# Patient Record
Sex: FEMALE | Race: WHITE | Employment: UNEMPLOYED | URBAN - METROPOLITAN AREA
[De-identification: names, ages, dates, MRNs, and addresses within clinical notes are randomized per-mention and may not be internally consistent; named-entity substitution may affect disease eponyms.]

---

## 2017-01-03 ENCOUNTER — HOSPITAL ENCOUNTER (OUTPATIENT)
Dept: PHYSICAL THERAPY | Age: 57
Discharge: HOME | End: 2017-01-03
Admitting: INTERNAL MEDICINE

## 2017-01-05 ENCOUNTER — HOSPITAL ENCOUNTER (OUTPATIENT)
Dept: PHYSICAL THERAPY | Age: 57
Discharge: HOME | End: 2017-01-05
Admitting: INTERNAL MEDICINE

## 2017-01-09 ENCOUNTER — HOSPITAL ENCOUNTER (OUTPATIENT)
Dept: PHYSICAL THERAPY | Age: 57
Discharge: HOME | End: 2017-01-09
Admitting: INTERNAL MEDICINE

## 2017-01-11 ENCOUNTER — HOSPITAL ENCOUNTER (OUTPATIENT)
Dept: PHYSICAL THERAPY | Age: 57
Discharge: HOME | End: 2017-01-11
Admitting: INTERNAL MEDICINE

## 2017-01-17 ENCOUNTER — HOSPITAL ENCOUNTER (OUTPATIENT)
Dept: PHYSICAL THERAPY | Age: 57
Discharge: HOME | End: 2017-01-17
Admitting: INTERNAL MEDICINE

## 2017-01-19 ENCOUNTER — HOSPITAL ENCOUNTER (OUTPATIENT)
Dept: PHYSICAL THERAPY | Age: 57
Discharge: HOME | End: 2017-01-19
Admitting: INTERNAL MEDICINE

## 2017-01-24 ENCOUNTER — HOSPITAL ENCOUNTER (OUTPATIENT)
Dept: PHYSICAL THERAPY | Age: 57
Discharge: HOME | End: 2017-01-24
Admitting: INTERNAL MEDICINE

## 2017-01-26 ENCOUNTER — HOSPITAL ENCOUNTER (OUTPATIENT)
Dept: PHYSICAL THERAPY | Age: 57
Discharge: HOME | End: 2017-01-26
Admitting: INTERNAL MEDICINE

## 2017-01-31 ENCOUNTER — HOSPITAL ENCOUNTER (OUTPATIENT)
Dept: PHYSICAL THERAPY | Age: 57
Discharge: HOME | End: 2017-01-31
Admitting: INTERNAL MEDICINE

## 2017-02-02 ENCOUNTER — HOSPITAL ENCOUNTER (OUTPATIENT)
Dept: PHYSICAL THERAPY | Age: 57
Discharge: HOME | End: 2017-02-02
Admitting: INTERNAL MEDICINE

## 2017-02-06 ENCOUNTER — HOSPITAL ENCOUNTER (OUTPATIENT)
Dept: PHYSICAL THERAPY | Age: 57
Discharge: HOME | End: 2017-02-06
Admitting: INTERNAL MEDICINE

## 2017-02-09 ENCOUNTER — HOSPITAL ENCOUNTER (OUTPATIENT)
Dept: PHYSICAL THERAPY | Age: 57
Discharge: HOME | End: 2017-02-09
Admitting: INTERNAL MEDICINE

## 2017-05-12 ENCOUNTER — OFFICE VISIT (OUTPATIENT)
Dept: INTERNAL MEDICINE CLINIC | Age: 57
End: 2017-05-12

## 2017-05-12 VITALS
DIASTOLIC BLOOD PRESSURE: 84 MMHG | BODY MASS INDEX: 27.64 KG/M2 | WEIGHT: 156 LBS | RESPIRATION RATE: 16 BRPM | SYSTOLIC BLOOD PRESSURE: 126 MMHG | HEIGHT: 63 IN

## 2017-05-12 DIAGNOSIS — K21.00 GASTROESOPHAGEAL REFLUX DISEASE WITH ESOPHAGITIS: Primary | ICD-10-CM

## 2017-05-12 PROCEDURE — 99213 OFFICE O/P EST LOW 20 MIN: CPT | Performed by: INTERNAL MEDICINE

## 2017-05-12 RX ORDER — OMEPRAZOLE 40 MG/1
40 CAPSULE, DELAYED RELEASE ORAL DAILY
Qty: 30 CAPSULE | Refills: 3 | Status: SHIPPED | OUTPATIENT
Start: 2017-05-12 | End: 2017-11-03 | Stop reason: SDUPTHER

## 2017-06-14 ENCOUNTER — OFFICE VISIT (OUTPATIENT)
Dept: INTERNAL MEDICINE CLINIC | Age: 57
End: 2017-06-14

## 2017-06-14 VITALS
HEIGHT: 63 IN | RESPIRATION RATE: 16 BRPM | BODY MASS INDEX: 26.93 KG/M2 | WEIGHT: 152 LBS | DIASTOLIC BLOOD PRESSURE: 78 MMHG | SYSTOLIC BLOOD PRESSURE: 108 MMHG

## 2017-06-14 DIAGNOSIS — E78.2 MIXED HYPERLIPIDEMIA: Chronic | ICD-10-CM

## 2017-06-14 DIAGNOSIS — M15.9 PRIMARY OSTEOARTHRITIS INVOLVING MULTIPLE JOINTS: Chronic | ICD-10-CM

## 2017-06-14 DIAGNOSIS — Z00.00 PHYSICAL EXAM, ANNUAL: Primary | ICD-10-CM

## 2017-06-14 DIAGNOSIS — K21.00 GASTROESOPHAGEAL REFLUX DISEASE WITH ESOPHAGITIS: Chronic | ICD-10-CM

## 2017-06-14 DIAGNOSIS — Z12.11 SCREENING FOR COLON CANCER: ICD-10-CM

## 2017-06-14 PROBLEM — M15.0 PRIMARY OSTEOARTHRITIS INVOLVING MULTIPLE JOINTS: Chronic | Status: ACTIVE | Noted: 2017-06-14

## 2017-06-14 LAB
A/G RATIO: 1.5 (ref 1.1–2.2)
ALBUMIN SERPL-MCNC: 4.5 G/DL (ref 3.4–5)
ALP BLD-CCNC: 99 U/L (ref 40–129)
ALT SERPL-CCNC: 64 U/L (ref 10–40)
ANION GAP SERPL CALCULATED.3IONS-SCNC: 16 MMOL/L (ref 3–16)
AST SERPL-CCNC: 45 U/L (ref 15–37)
BASOPHILS ABSOLUTE: 0.1 K/UL (ref 0–0.2)
BASOPHILS RELATIVE PERCENT: 0.8 %
BILIRUB SERPL-MCNC: 0.5 MG/DL (ref 0–1)
BUN BLDV-MCNC: 15 MG/DL (ref 7–20)
CALCIUM SERPL-MCNC: 9.5 MG/DL (ref 8.3–10.6)
CHLORIDE BLD-SCNC: 100 MMOL/L (ref 99–110)
CHOLESTEROL, TOTAL: 240 MG/DL (ref 0–199)
CO2: 26 MMOL/L (ref 21–32)
CREAT SERPL-MCNC: 0.7 MG/DL (ref 0.6–1.1)
EOSINOPHILS ABSOLUTE: 0.1 K/UL (ref 0–0.6)
EOSINOPHILS RELATIVE PERCENT: 2.3 %
GFR AFRICAN AMERICAN: >60
GFR NON-AFRICAN AMERICAN: >60
GLOBULIN: 3.1 G/DL
GLUCOSE BLD-MCNC: 103 MG/DL (ref 70–99)
HCT VFR BLD CALC: 44.7 % (ref 36–48)
HDLC SERPL-MCNC: 51 MG/DL (ref 40–60)
HEMOGLOBIN: 14.5 G/DL (ref 12–16)
LDL CHOLESTEROL CALCULATED: 145 MG/DL
LYMPHOCYTES ABSOLUTE: 1.9 K/UL (ref 1–5.1)
LYMPHOCYTES RELATIVE PERCENT: 28.7 %
MCH RBC QN AUTO: 32.2 PG (ref 26–34)
MCHC RBC AUTO-ENTMCNC: 32.3 G/DL (ref 31–36)
MCV RBC AUTO: 99.5 FL (ref 80–100)
MONOCYTES ABSOLUTE: 0.5 K/UL (ref 0–1.3)
MONOCYTES RELATIVE PERCENT: 7 %
NEUTROPHILS ABSOLUTE: 4 K/UL (ref 1.7–7.7)
NEUTROPHILS RELATIVE PERCENT: 61.2 %
PDW BLD-RTO: 12.8 % (ref 12.4–15.4)
PLATELET # BLD: 227 K/UL (ref 135–450)
PMV BLD AUTO: 10.2 FL (ref 5–10.5)
POTASSIUM SERPL-SCNC: 4.5 MMOL/L (ref 3.5–5.1)
RBC # BLD: 4.5 M/UL (ref 4–5.2)
SODIUM BLD-SCNC: 142 MMOL/L (ref 136–145)
TOTAL PROTEIN: 7.6 G/DL (ref 6.4–8.2)
TRIGL SERPL-MCNC: 221 MG/DL (ref 0–150)
TSH SERPL DL<=0.05 MIU/L-ACNC: 1.48 UIU/ML (ref 0.27–4.2)
VLDLC SERPL CALC-MCNC: 44 MG/DL
WBC # BLD: 6.5 K/UL (ref 4–11)

## 2017-06-14 PROCEDURE — 99396 PREV VISIT EST AGE 40-64: CPT | Performed by: INTERNAL MEDICINE

## 2017-06-14 PROCEDURE — 36415 COLL VENOUS BLD VENIPUNCTURE: CPT | Performed by: INTERNAL MEDICINE

## 2017-06-14 ASSESSMENT — PATIENT HEALTH QUESTIONNAIRE - PHQ9
2. FEELING DOWN, DEPRESSED OR HOPELESS: 0
1. LITTLE INTEREST OR PLEASURE IN DOING THINGS: 0
SUM OF ALL RESPONSES TO PHQ9 QUESTIONS 1 & 2: 0
SUM OF ALL RESPONSES TO PHQ QUESTIONS 1-9: 0

## 2017-06-17 ENCOUNTER — TELEPHONE (OUTPATIENT)
Dept: INTERNAL MEDICINE CLINIC | Age: 57
End: 2017-06-17

## 2017-06-17 RX ORDER — METHYLPREDNISOLONE 4 MG/1
TABLET ORAL
Qty: 1 KIT | Refills: 0 | Status: SHIPPED | OUTPATIENT
Start: 2017-06-17 | End: 2017-06-23

## 2017-10-03 RX ORDER — MELOXICAM 15 MG/1
TABLET ORAL
Qty: 30 TABLET | Refills: 3 | Status: SHIPPED | OUTPATIENT
Start: 2017-10-03 | End: 2019-06-25

## 2017-11-06 RX ORDER — OMEPRAZOLE 40 MG/1
40 CAPSULE, DELAYED RELEASE ORAL DAILY
Qty: 30 CAPSULE | Refills: 5 | Status: SHIPPED | OUTPATIENT
Start: 2017-11-06 | End: 2019-01-03 | Stop reason: SDUPTHER

## 2019-01-03 ENCOUNTER — OFFICE VISIT (OUTPATIENT)
Dept: INTERNAL MEDICINE CLINIC | Age: 59
End: 2019-01-03

## 2019-01-03 VITALS
HEART RATE: 112 BPM | DIASTOLIC BLOOD PRESSURE: 60 MMHG | BODY MASS INDEX: 27.1 KG/M2 | OXYGEN SATURATION: 98 % | WEIGHT: 153 LBS | SYSTOLIC BLOOD PRESSURE: 114 MMHG

## 2019-01-03 DIAGNOSIS — H60.331 ACUTE SWIMMER'S EAR OF RIGHT SIDE: Primary | ICD-10-CM

## 2019-01-03 DIAGNOSIS — K21.00 GASTROESOPHAGEAL REFLUX DISEASE WITH ESOPHAGITIS: Chronic | ICD-10-CM

## 2019-01-03 PROCEDURE — 99214 OFFICE O/P EST MOD 30 MIN: CPT | Performed by: NURSE PRACTITIONER

## 2019-01-03 RX ORDER — NEOMYCIN SULFATE, POLYMYXIN B SULFATE AND HYDROCORTISONE 10; 3.5; 1 MG/ML; MG/ML; [USP'U]/ML
3 SUSPENSION/ DROPS AURICULAR (OTIC) 4 TIMES DAILY
Qty: 1 BOTTLE | Refills: 0 | Status: SHIPPED | OUTPATIENT
Start: 2019-01-03 | End: 2019-01-10

## 2019-01-03 RX ORDER — OMEPRAZOLE 40 MG/1
40 CAPSULE, DELAYED RELEASE ORAL DAILY
Qty: 30 CAPSULE | Refills: 5 | Status: SHIPPED | OUTPATIENT
Start: 2019-01-03 | End: 2019-11-14 | Stop reason: SDUPTHER

## 2019-01-03 ASSESSMENT — ENCOUNTER SYMPTOMS
RHINORRHEA: 0
ABDOMINAL PAIN: 0
SHORTNESS OF BREATH: 0
CHEST TIGHTNESS: 0
DIARRHEA: 0
APNEA: 0
CHOKING: 0
WHEEZING: 0
ABDOMINAL DISTENTION: 0
COUGH: 0
VOMITING: 0
BACK PAIN: 0
STRIDOR: 0
SORE THROAT: 0

## 2019-06-25 ENCOUNTER — OFFICE VISIT (OUTPATIENT)
Dept: INTERNAL MEDICINE CLINIC | Age: 59
End: 2019-06-25
Payer: MEDICAID

## 2019-06-25 VITALS
HEIGHT: 63 IN | RESPIRATION RATE: 12 BRPM | SYSTOLIC BLOOD PRESSURE: 124 MMHG | TEMPERATURE: 97.8 F | HEART RATE: 88 BPM | DIASTOLIC BLOOD PRESSURE: 86 MMHG | BODY MASS INDEX: 26.05 KG/M2 | WEIGHT: 147 LBS | OXYGEN SATURATION: 97 %

## 2019-06-25 DIAGNOSIS — R19.7 DIARRHEA, UNSPECIFIED TYPE: ICD-10-CM

## 2019-06-25 DIAGNOSIS — R11.0 NAUSEA: Primary | ICD-10-CM

## 2019-06-25 LAB
ANION GAP SERPL CALCULATED.3IONS-SCNC: 12 MMOL/L (ref 3–16)
BUN BLDV-MCNC: 14 MG/DL (ref 7–20)
CALCIUM SERPL-MCNC: 9.6 MG/DL (ref 8.3–10.6)
CHLORIDE BLD-SCNC: 102 MMOL/L (ref 99–110)
CO2: 22 MMOL/L (ref 21–32)
CREAT SERPL-MCNC: 0.8 MG/DL (ref 0.6–1.1)
GFR AFRICAN AMERICAN: >60
GFR NON-AFRICAN AMERICAN: >60
GLUCOSE BLD-MCNC: 96 MG/DL (ref 70–99)
HCT VFR BLD CALC: 44 % (ref 36–48)
HEMOGLOBIN: 14.9 G/DL (ref 12–16)
MCH RBC QN AUTO: 33.4 PG (ref 26–34)
MCHC RBC AUTO-ENTMCNC: 33.8 G/DL (ref 31–36)
MCV RBC AUTO: 98.9 FL (ref 80–100)
PDW BLD-RTO: 13.2 % (ref 12.4–15.4)
PLATELET # BLD: 227 K/UL (ref 135–450)
PMV BLD AUTO: 9.9 FL (ref 5–10.5)
POTASSIUM SERPL-SCNC: 4.1 MMOL/L (ref 3.5–5.1)
RBC # BLD: 4.45 M/UL (ref 4–5.2)
SODIUM BLD-SCNC: 136 MMOL/L (ref 136–145)
WBC # BLD: 5.8 K/UL (ref 4–11)

## 2019-06-25 PROCEDURE — 1036F TOBACCO NON-USER: CPT | Performed by: NURSE PRACTITIONER

## 2019-06-25 PROCEDURE — 3017F COLORECTAL CA SCREEN DOC REV: CPT | Performed by: NURSE PRACTITIONER

## 2019-06-25 PROCEDURE — 99213 OFFICE O/P EST LOW 20 MIN: CPT | Performed by: NURSE PRACTITIONER

## 2019-06-25 PROCEDURE — G8427 DOCREV CUR MEDS BY ELIG CLIN: HCPCS | Performed by: NURSE PRACTITIONER

## 2019-06-25 PROCEDURE — 36415 COLL VENOUS BLD VENIPUNCTURE: CPT | Performed by: NURSE PRACTITIONER

## 2019-06-25 PROCEDURE — G8419 CALC BMI OUT NRM PARAM NOF/U: HCPCS | Performed by: NURSE PRACTITIONER

## 2019-06-25 PROCEDURE — 3014F SCREEN MAMMO DOC REV: CPT | Performed by: NURSE PRACTITIONER

## 2019-06-25 RX ORDER — ONDANSETRON 4 MG/1
4 TABLET, ORALLY DISINTEGRATING ORAL EVERY 8 HOURS PRN
Qty: 20 TABLET | Refills: 0 | Status: SHIPPED | OUTPATIENT
Start: 2019-06-25 | End: 2019-11-14

## 2019-06-25 RX ORDER — GREEN TEA/HOODIA GORDONII 315-12.5MG
1 CAPSULE ORAL DAILY
Qty: 14 TABLET | Refills: 0 | Status: SHIPPED | OUTPATIENT
Start: 2019-06-25 | End: 2019-07-09

## 2019-06-25 ASSESSMENT — ENCOUNTER SYMPTOMS
DIARRHEA: 1
BLOOD IN STOOL: 0
RHINORRHEA: 0
SHORTNESS OF BREATH: 0
CHANGE IN BOWEL HABIT: 1
VOMITING: 1
CHEST TIGHTNESS: 0
SORE THROAT: 0
VISUAL CHANGE: 0
CONSTIPATION: 0
FLATUS: 0
STRIDOR: 0
ABDOMINAL PAIN: 0
COLOR CHANGE: 0
BACK PAIN: 0
ABDOMINAL DISTENTION: 0
SINUS PAIN: 0
ANAL BLEEDING: 0
SINUS PRESSURE: 0
COUGH: 0
CHOKING: 0
SWOLLEN GLANDS: 0
NAUSEA: 1
BLOATING: 0

## 2019-06-25 ASSESSMENT — PATIENT HEALTH QUESTIONNAIRE - PHQ9: DEPRESSION UNABLE TO ASSESS: URGENT/EMERGENT SITUATION

## 2019-06-25 NOTE — PROGRESS NOTES
Pt is here for: nausea, and diarrhea     Chief Complaint   Patient presents with    Nausea     since sat @ 3 ,    Diarrhea     lost about 4 pounds since sat,    Other     was recently in [place] returned on the 7th started gettuing sick on the 6th       Diarrhea    This is a new problem. The current episode started yesterday. The problem occurs 5 to 10 times per day. The problem has been waxing and waning. The stool consistency is described as watery. The patient states that diarrhea does not awaken her from sleep. Associated symptoms include vomiting. Pertinent negatives include no abdominal pain, arthralgias, bloating, chills, coughing, fever, headaches, increased  flatus, myalgias, sweats, URI or weight loss. Exacerbated by: eating  There are no known risk factors. She has tried anti-motility drug for the symptoms. The treatment provided mild relief. There is no history of bowel resection, inflammatory bowel disease, irritable bowel syndrome, malabsorption, a recent abdominal surgery or short gut syndrome. Nausea & Vomiting   This is a new problem. The current episode started in the past 7 days. The problem occurs intermittently. The problem has been gradually improving. Associated symptoms include a change in bowel habit, fatigue, nausea, vomiting and weakness. Pertinent negatives include no abdominal pain, anorexia, arthralgias, chest pain, chills, congestion, coughing, fever, headaches, joint swelling, myalgias, neck pain, numbness, rash, sore throat, swollen glands, urinary symptoms, vertigo or visual change. The symptoms are aggravated by eating and drinking. She has tried rest, sleep and drinking for the symptoms. The treatment provided mild relief. /86 (Site: Left Upper Arm, Position: Sitting, Cuff Size: Medium Adult)   Pulse 88   Temp 97.8 °F (36.6 °C)   Resp 12   Ht 5' 3\" (1.6 m)   Wt 147 lb (66.7 kg)   LMP 10/03/2011   SpO2 97%   Breastfeeding?  No   BMI 26.04 kg/m²       Past Medical History:   Diagnosis Date    Fibromyalgia     Gastroesophageal reflux disease with esophagitis 6/14/2017    Lateral epicondylitis of left elbow 12/5/2016    Mixed hyperlipidemia 6/14/2017    Primary osteoarthritis involving multiple joints 6/14/2017       Past Surgical History:   Procedure Laterality Date    BLADDER SURGERY      CHOLECYSTECTOMY      SINUS SURGERY         Allergies   Allergen Reactions    Codeine Nausea And Vomiting       Outpatient Medications Marked as Taking for the 6/25/19 encounter (Office Visit) with 26 MINESH Jesus CNP   Medication Sig Dispense Refill    omeprazole (PRILOSEC) 40 MG delayed release capsule Take 1 capsule by mouth daily 30 capsule 5       Family History   Problem Relation Age of Onset    Breast Cancer Mother     Stroke Father     Diabetes Maternal Aunt     High Blood Pressure Maternal Aunt     Diabetes Maternal Uncle     High Blood Pressure Maternal Uncle        Social History     Socioeconomic History    Marital status:      Spouse name: Not on file    Number of children: Not on file    Years of education: Not on file    Highest education level: Not on file   Occupational History    Not on file   Social Needs    Financial resource strain: Not on file    Food insecurity:     Worry: Not on file     Inability: Not on file    Transportation needs:     Medical: Not on file     Non-medical: Not on file   Tobacco Use    Smoking status: Former Smoker     Packs/day: 0.50     Types: Cigarettes    Smokeless tobacco: Never Used   Substance and Sexual Activity    Alcohol use: No    Drug use: No    Sexual activity: Yes   Lifestyle    Physical activity:     Days per week: Not on file     Minutes per session: Not on file    Stress: Not on file   Relationships    Social connections:     Talks on phone: Not on file     Gets together: Not on file     Attends Nondenominational service: Not on file     Active member of club or organization: Not on file     Attends meetings of clubs or organizations: Not on file     Relationship status: Not on file    Intimate partner violence:     Fear of current or ex partner: Not on file     Emotionally abused: Not on file     Physically abused: Not on file     Forced sexual activity: Not on file   Other Topics Concern    Not on file   Social History Narrative    Not on file       Review of Systems   Constitutional: Positive for activity change, appetite change, fatigue and unexpected weight change (4 lb weight loss over the past 5 days ). Negative for chills, fever and weight loss. HENT: Negative for congestion, rhinorrhea, sinus pressure, sinus pain, sneezing and sore throat. Respiratory: Negative for cough, choking, chest tightness, shortness of breath and stridor. Cardiovascular: Negative for chest pain. Gastrointestinal: Positive for change in bowel habit, diarrhea, nausea and vomiting. Negative for abdominal distention, abdominal pain, anal bleeding, anorexia, bloating, blood in stool, constipation and flatus. Genitourinary: Negative for dysuria. Musculoskeletal: Negative for arthralgias, back pain, joint swelling, myalgias and neck pain. Skin: Negative for color change, pallor and rash. Neurological: Positive for weakness. Negative for vertigo, numbness and headaches. Physical Exam   Nursing note reviewed  /86 (Site: Left Upper Arm, Position: Sitting, Cuff Size: Medium Adult)   Pulse 88   Temp 97.8 °F (36.6 °C)   Resp 12   Ht 5' 3\" (1.6 m)   Wt 147 lb (66.7 kg)   LMP 10/03/2011   SpO2 97%   Breastfeeding? No   BMI 26.04 kg/m²   BP Readings from Last 3 Encounters:   06/25/19 124/86   01/03/19 114/60   06/14/17 108/78     Wt Readings from Last 3 Encounters:   06/25/19 147 lb (66.7 kg)   01/03/19 153 lb (69.4 kg)   06/14/17 152 lb (68.9 kg)     Body mass index is 26.04 kg/m². No results found for this visit on 06/25/19.        Physical Exam   Constitutional: She is oriented to person, place, and time. She appears well-developed and well-nourished. Cardiovascular: Normal rate and normal heart sounds. Pulmonary/Chest: Effort normal and breath sounds normal. No stridor. No respiratory distress. She has no wheezes. She has no rales. She exhibits no tenderness. Abdominal: Soft. Bowel sounds are normal. She exhibits no distension and no mass. There is no tenderness. There is no rebound and no guarding. No hernia. Abdomen soft, non-guarded, non-rigid, no peritoneal s/s appreciated upon exam    Neurological: She is alert and oriented to person, place, and time. No cranial nerve deficit. Coordination normal.   Psychiatric: She has a normal mood and affect. Her behavior is normal.        Assessment/Plan   Rafael Silvestre was seen today for nausea, diarrhea and other. Diagnoses and all orders for this visit:    Nausea, suspect 2/2 to viral gastroenteritis   -     CBC; Future  -     BASIC METABOLIC PANEL; Future  -     ondansetron (ZOFRAN ODT) 4 MG disintegrating tablet; Take 1 tablet by mouth every 8 hours as needed for Nausea  -     Lactobacillus (PROBIOTIC ACIDOPHILUS) TABS; Take 1 tablet by mouth daily for 14 days    Diarrhea, unspecified type, suspect 2/2 to viral gastroenteritis   -     CBC; Future  -     BASIC METABOLIC PANEL; Future  -     ondansetron (ZOFRAN ODT) 4 MG disintegrating tablet; Take 1 tablet by mouth every 8 hours as needed for Nausea  -     Lactobacillus (PROBIOTIC ACIDOPHILUS) TABS; Take 1 tablet by mouth daily for 14 days    Patient instructed should SOB/CP or any other concerning s/s occur, to go to ER ASAP  All questions addressed and answered, patient agrees with plan of care. No follow-ups on file.

## 2019-10-29 ENCOUNTER — OFFICE VISIT (OUTPATIENT)
Dept: INTERNAL MEDICINE CLINIC | Age: 59
End: 2019-10-29
Payer: MEDICAID

## 2019-10-29 VITALS
HEART RATE: 88 BPM | BODY MASS INDEX: 26.97 KG/M2 | HEIGHT: 63 IN | DIASTOLIC BLOOD PRESSURE: 81 MMHG | SYSTOLIC BLOOD PRESSURE: 115 MMHG | RESPIRATION RATE: 16 BRPM | WEIGHT: 152.2 LBS | OXYGEN SATURATION: 97 % | TEMPERATURE: 97.9 F

## 2019-10-29 DIAGNOSIS — J01.10 ACUTE NON-RECURRENT FRONTAL SINUSITIS: Primary | ICD-10-CM

## 2019-10-29 PROCEDURE — G9899 SCRN MAM PERF RSLTS DOC: HCPCS | Performed by: NURSE PRACTITIONER

## 2019-10-29 PROCEDURE — 3017F COLORECTAL CA SCREEN DOC REV: CPT | Performed by: NURSE PRACTITIONER

## 2019-10-29 PROCEDURE — 1036F TOBACCO NON-USER: CPT | Performed by: NURSE PRACTITIONER

## 2019-10-29 PROCEDURE — G8484 FLU IMMUNIZE NO ADMIN: HCPCS | Performed by: NURSE PRACTITIONER

## 2019-10-29 PROCEDURE — G8427 DOCREV CUR MEDS BY ELIG CLIN: HCPCS | Performed by: NURSE PRACTITIONER

## 2019-10-29 PROCEDURE — G8419 CALC BMI OUT NRM PARAM NOF/U: HCPCS | Performed by: NURSE PRACTITIONER

## 2019-10-29 PROCEDURE — 99213 OFFICE O/P EST LOW 20 MIN: CPT | Performed by: NURSE PRACTITIONER

## 2019-10-29 RX ORDER — VALACYCLOVIR HYDROCHLORIDE 500 MG/1
TABLET, FILM COATED ORAL
Refills: 12 | COMMUNITY
Start: 2019-10-09

## 2019-10-29 RX ORDER — AMOXICILLIN AND CLAVULANATE POTASSIUM 875; 125 MG/1; MG/1
1 TABLET, FILM COATED ORAL 2 TIMES DAILY
Qty: 20 TABLET | Refills: 0 | Status: SHIPPED | OUTPATIENT
Start: 2019-10-29 | End: 2019-11-08

## 2019-10-29 ASSESSMENT — PATIENT HEALTH QUESTIONNAIRE - PHQ9
1. LITTLE INTEREST OR PLEASURE IN DOING THINGS: 0
SUM OF ALL RESPONSES TO PHQ QUESTIONS 1-9: 0
SUM OF ALL RESPONSES TO PHQ9 QUESTIONS 1 & 2: 0
2. FEELING DOWN, DEPRESSED OR HOPELESS: 0
SUM OF ALL RESPONSES TO PHQ QUESTIONS 1-9: 0

## 2019-10-29 ASSESSMENT — ENCOUNTER SYMPTOMS
CHOKING: 0
NAUSEA: 0
CHEST TIGHTNESS: 0
SORE THROAT: 1
RHINORRHEA: 0
SHORTNESS OF BREATH: 0
WHEEZING: 0
SINUS PRESSURE: 1
DIARRHEA: 0
ABDOMINAL PAIN: 0
SWOLLEN GLANDS: 1
COUGH: 1
COLOR CHANGE: 0
SINUS PAIN: 1
VOMITING: 0
STRIDOR: 0

## 2019-11-14 ENCOUNTER — OFFICE VISIT (OUTPATIENT)
Dept: INTERNAL MEDICINE CLINIC | Age: 59
End: 2019-11-14
Payer: MEDICAID

## 2019-11-14 VITALS
WEIGHT: 152 LBS | HEIGHT: 63 IN | DIASTOLIC BLOOD PRESSURE: 78 MMHG | OXYGEN SATURATION: 97 % | BODY MASS INDEX: 26.93 KG/M2 | SYSTOLIC BLOOD PRESSURE: 130 MMHG | HEART RATE: 103 BPM

## 2019-11-14 DIAGNOSIS — K21.00 GASTROESOPHAGEAL REFLUX DISEASE WITH ESOPHAGITIS: Primary | Chronic | ICD-10-CM

## 2019-11-14 DIAGNOSIS — M19.90 OSTEOARTHRITIS, UNSPECIFIED OSTEOARTHRITIS TYPE, UNSPECIFIED SITE: ICD-10-CM

## 2019-11-14 DIAGNOSIS — M79.7 FIBROMYALGIA: ICD-10-CM

## 2019-11-14 DIAGNOSIS — E78.2 MIXED HYPERLIPIDEMIA: ICD-10-CM

## 2019-11-14 PROCEDURE — G9899 SCRN MAM PERF RSLTS DOC: HCPCS | Performed by: NURSE PRACTITIONER

## 2019-11-14 PROCEDURE — 90715 TDAP VACCINE 7 YRS/> IM: CPT | Performed by: NURSE PRACTITIONER

## 2019-11-14 PROCEDURE — 3017F COLORECTAL CA SCREEN DOC REV: CPT | Performed by: NURSE PRACTITIONER

## 2019-11-14 PROCEDURE — 90686 IIV4 VACC NO PRSV 0.5 ML IM: CPT | Performed by: NURSE PRACTITIONER

## 2019-11-14 PROCEDURE — 1036F TOBACCO NON-USER: CPT | Performed by: NURSE PRACTITIONER

## 2019-11-14 PROCEDURE — G8482 FLU IMMUNIZE ORDER/ADMIN: HCPCS | Performed by: NURSE PRACTITIONER

## 2019-11-14 PROCEDURE — 99214 OFFICE O/P EST MOD 30 MIN: CPT | Performed by: NURSE PRACTITIONER

## 2019-11-14 PROCEDURE — G8419 CALC BMI OUT NRM PARAM NOF/U: HCPCS | Performed by: NURSE PRACTITIONER

## 2019-11-14 PROCEDURE — G8427 DOCREV CUR MEDS BY ELIG CLIN: HCPCS | Performed by: NURSE PRACTITIONER

## 2019-11-14 PROCEDURE — 90471 IMMUNIZATION ADMIN: CPT | Performed by: NURSE PRACTITIONER

## 2019-11-14 PROCEDURE — 90472 IMMUNIZATION ADMIN EACH ADD: CPT | Performed by: NURSE PRACTITIONER

## 2019-11-14 RX ORDER — CALCIUM CARBONATE 200(500)MG
1 TABLET,CHEWABLE ORAL DAILY
COMMUNITY

## 2019-11-14 RX ORDER — CHOLECALCIFEROL (VITAMIN D3) 125 MCG
1 CAPSULE ORAL DAILY
COMMUNITY
End: 2021-01-08

## 2019-11-14 RX ORDER — OMEPRAZOLE 40 MG/1
40 CAPSULE, DELAYED RELEASE ORAL DAILY
Qty: 30 CAPSULE | Refills: 5 | Status: SHIPPED | OUTPATIENT
Start: 2019-11-14 | End: 2021-01-08 | Stop reason: ALTCHOICE

## 2019-11-14 ASSESSMENT — ENCOUNTER SYMPTOMS
WHEEZING: 0
SINUS PAIN: 0
NAUSEA: 0
RHINORRHEA: 0
SHORTNESS OF BREATH: 0
HEARTBURN: 0
SORE THROAT: 0
TROUBLE SWALLOWING: 0
BACK PAIN: 0
ABDOMINAL PAIN: 0
BELCHING: 0
WATER BRASH: 0
CHEST TIGHTNESS: 0
VOMITING: 0
CONSTIPATION: 0
EYE PAIN: 0
SINUS PRESSURE: 0
STRIDOR: 0
PHOTOPHOBIA: 0
COUGH: 0
CHOKING: 0
GLOBUS SENSATION: 0
HOARSE VOICE: 0
COLOR CHANGE: 0
DIARRHEA: 0

## 2019-11-22 ENCOUNTER — OFFICE VISIT (OUTPATIENT)
Dept: INTERNAL MEDICINE CLINIC | Age: 59
End: 2019-11-22
Payer: MEDICAID

## 2019-11-22 VITALS
WEIGHT: 151 LBS | DIASTOLIC BLOOD PRESSURE: 68 MMHG | HEART RATE: 74 BPM | HEIGHT: 63 IN | OXYGEN SATURATION: 97 % | SYSTOLIC BLOOD PRESSURE: 98 MMHG | BODY MASS INDEX: 26.75 KG/M2

## 2019-11-22 DIAGNOSIS — Z00.00 PHYSICAL EXAM: Primary | ICD-10-CM

## 2019-11-22 LAB
A/G RATIO: 1.5 (ref 1.1–2.2)
ALBUMIN SERPL-MCNC: 4.4 G/DL (ref 3.4–5)
ALP BLD-CCNC: 86 U/L (ref 40–129)
ALT SERPL-CCNC: 66 U/L (ref 10–40)
ANION GAP SERPL CALCULATED.3IONS-SCNC: 12 MMOL/L (ref 3–16)
AST SERPL-CCNC: 55 U/L (ref 15–37)
BILIRUB SERPL-MCNC: 0.4 MG/DL (ref 0–1)
BUN BLDV-MCNC: 15 MG/DL (ref 7–20)
CALCIUM SERPL-MCNC: 9.4 MG/DL (ref 8.3–10.6)
CHLORIDE BLD-SCNC: 101 MMOL/L (ref 99–110)
CHOLESTEROL, TOTAL: 214 MG/DL (ref 0–199)
CO2: 25 MMOL/L (ref 21–32)
CREAT SERPL-MCNC: 0.8 MG/DL (ref 0.6–1.1)
GFR AFRICAN AMERICAN: >60
GFR NON-AFRICAN AMERICAN: >60
GLOBULIN: 3 G/DL
GLUCOSE BLD-MCNC: 99 MG/DL (ref 70–99)
HCT VFR BLD CALC: 40.9 % (ref 36–48)
HDLC SERPL-MCNC: 56 MG/DL (ref 40–60)
HEMOGLOBIN: 14.2 G/DL (ref 12–16)
HEPATITIS C ANTIBODY INTERPRETATION: NORMAL
HIV AG/AB: NORMAL
HIV ANTIGEN: NORMAL
HIV-1 ANTIBODY: NORMAL
HIV-2 AB: NORMAL
LDL CHOLESTEROL CALCULATED: 113 MG/DL
MCH RBC QN AUTO: 34.2 PG (ref 26–34)
MCHC RBC AUTO-ENTMCNC: 34.6 G/DL (ref 31–36)
MCV RBC AUTO: 98.7 FL (ref 80–100)
PDW BLD-RTO: 12.7 % (ref 12.4–15.4)
PLATELET # BLD: 233 K/UL (ref 135–450)
PMV BLD AUTO: 9.4 FL (ref 5–10.5)
POTASSIUM SERPL-SCNC: 4.3 MMOL/L (ref 3.5–5.1)
RBC # BLD: 4.15 M/UL (ref 4–5.2)
SODIUM BLD-SCNC: 138 MMOL/L (ref 136–145)
TOTAL PROTEIN: 7.4 G/DL (ref 6.4–8.2)
TRIGL SERPL-MCNC: 224 MG/DL (ref 0–150)
TSH REFLEX FT4: 1.29 UIU/ML (ref 0.27–4.2)
VITAMIN D 25-HYDROXY: 29.6 NG/ML
VLDLC SERPL CALC-MCNC: 45 MG/DL
WBC # BLD: 6.4 K/UL (ref 4–11)

## 2019-11-22 PROCEDURE — G8482 FLU IMMUNIZE ORDER/ADMIN: HCPCS | Performed by: NURSE PRACTITIONER

## 2019-11-22 PROCEDURE — 36415 COLL VENOUS BLD VENIPUNCTURE: CPT | Performed by: NURSE PRACTITIONER

## 2019-11-22 PROCEDURE — 99396 PREV VISIT EST AGE 40-64: CPT | Performed by: NURSE PRACTITIONER

## 2019-11-22 ASSESSMENT — ENCOUNTER SYMPTOMS
COUGH: 0
EYE PAIN: 0
SORE THROAT: 0
BACK PAIN: 0
SINUS PRESSURE: 0
NAUSEA: 0
CHEST TIGHTNESS: 0
RHINORRHEA: 0
FACIAL SWELLING: 0
DIARRHEA: 0
WHEEZING: 0
SINUS PAIN: 0
PHOTOPHOBIA: 0
TROUBLE SWALLOWING: 0
CONSTIPATION: 0
SHORTNESS OF BREATH: 0
VOMITING: 0
COLOR CHANGE: 0
ABDOMINAL PAIN: 0

## 2019-11-23 LAB
ESTIMATED AVERAGE GLUCOSE: 122.6 MG/DL
HBA1C MFR BLD: 5.9 %

## 2019-11-25 DIAGNOSIS — Z00.00 PHYSICAL EXAM: Primary | ICD-10-CM

## 2019-11-26 ENCOUNTER — NURSE ONLY (OUTPATIENT)
Dept: INTERNAL MEDICINE CLINIC | Age: 59
End: 2019-11-26
Payer: MEDICAID

## 2019-11-26 DIAGNOSIS — Z00.00 PHYSICAL EXAM: ICD-10-CM

## 2019-11-26 LAB
CONTROL: NORMAL
HAV IGM SER IA-ACNC: NORMAL
HEMOCCULT STL QL: NEGATIVE
HEPATITIS B CORE IGM ANTIBODY: NORMAL
HEPATITIS B SURFACE ANTIGEN INTERPRETATION: NORMAL
HEPATITIS C ANTIBODY INTERPRETATION: NORMAL

## 2019-11-26 PROCEDURE — 82274 ASSAY TEST FOR BLOOD FECAL: CPT | Performed by: NURSE PRACTITIONER

## 2020-08-14 ENCOUNTER — OFFICE VISIT (OUTPATIENT)
Dept: INTERNAL MEDICINE CLINIC | Age: 60
End: 2020-08-14
Payer: MEDICAID

## 2020-08-14 ENCOUNTER — NURSE TRIAGE (OUTPATIENT)
Dept: OTHER | Facility: CLINIC | Age: 60
End: 2020-08-14

## 2020-08-14 VITALS
WEIGHT: 154.4 LBS | DIASTOLIC BLOOD PRESSURE: 80 MMHG | RESPIRATION RATE: 18 BRPM | BODY MASS INDEX: 27.36 KG/M2 | OXYGEN SATURATION: 98 % | TEMPERATURE: 97.9 F | HEIGHT: 63 IN | SYSTOLIC BLOOD PRESSURE: 118 MMHG | HEART RATE: 92 BPM

## 2020-08-14 PROCEDURE — G8427 DOCREV CUR MEDS BY ELIG CLIN: HCPCS | Performed by: NURSE PRACTITIONER

## 2020-08-14 PROCEDURE — G8419 CALC BMI OUT NRM PARAM NOF/U: HCPCS | Performed by: NURSE PRACTITIONER

## 2020-08-14 PROCEDURE — 3017F COLORECTAL CA SCREEN DOC REV: CPT | Performed by: NURSE PRACTITIONER

## 2020-08-14 PROCEDURE — 99213 OFFICE O/P EST LOW 20 MIN: CPT | Performed by: NURSE PRACTITIONER

## 2020-08-14 PROCEDURE — 1036F TOBACCO NON-USER: CPT | Performed by: NURSE PRACTITIONER

## 2020-08-14 RX ORDER — PREDNISONE 20 MG/1
TABLET ORAL
Qty: 36 TABLET | Refills: 0 | Status: SHIPPED | OUTPATIENT
Start: 2020-08-14 | End: 2021-01-08 | Stop reason: ALTCHOICE

## 2020-08-14 ASSESSMENT — PATIENT HEALTH QUESTIONNAIRE - PHQ9
2. FEELING DOWN, DEPRESSED OR HOPELESS: 0
SUM OF ALL RESPONSES TO PHQ QUESTIONS 1-9: 0
SUM OF ALL RESPONSES TO PHQ9 QUESTIONS 1 & 2: 0
1. LITTLE INTEREST OR PLEASURE IN DOING THINGS: 0
SUM OF ALL RESPONSES TO PHQ QUESTIONS 1-9: 0

## 2020-08-14 ASSESSMENT — ENCOUNTER SYMPTOMS
SHORTNESS OF BREATH: 0
ABDOMINAL PAIN: 0

## 2020-08-14 NOTE — PROGRESS NOTES
2020     Evin Storey (:  1960) is a 63 y.o. female, here for evaluation of the following medical concerns:    Chief Complaint   Patient presents with   [facility]     since monday       HPI     64 yo  female presents today in the office for a rash x 5 days. She states that she was pulling weeds and noted poison ivy , she was wearing gloves but still got the rash. Rash is to her left arm , under left breast , left hip , neck  and groin area. She has been using ivy dry and benadryl for the rash . + itching. Denies any fevers, drainage, or pain . Review of Systems   Constitutional: Negative for appetite change, chills, fatigue and fever. Respiratory: Negative for shortness of breath. Cardiovascular: Negative for chest pain and palpitations. Gastrointestinal: Negative for abdominal pain. Musculoskeletal: Negative for myalgias. Skin: Positive for rash. Prior to Visit Medications    Medication Sig Taking? Authorizing Provider   predniSONE (DELTASONE) 20 MG tablet Taper dose: take 3 tablets by mouth daily x 4 days, then 2 tablets by mouth daily for 4 days , then 1 tablet by mouth daily x 4 days, then 1/2 tablet by mouth daily x 4 days.  Yes MINESH Duron CNP   triamcinolone (KENALOG) 0.1 % ointment Apply topically 2 times daily for 7 days Yes MINESH Duron CNP   [employer] OMEGA-3 KRILL  MG CAPS Take 1 capsule by mouth daily Yes Historical Provider, MD   Misc Natural Products (SUPER GREENS PO) Take 2 capsules by mouth daily Yes Historical Provider, MD   ASTAXANTHIN PO Take 1 Bar by mouth daily Yes Historical Provider, MD   calcium carbonate (TUMS) 500 MG chewable tablet Take 1 tablet by mouth daily Yes Historical Provider, MD   omeprazole (PRILOSEC) 40 MG delayed release capsule Take 1 capsule by mouth daily Yes MINESH Schulz CNP   valACYclovir (VALTREX) 500 MG tablet TK 1 T PO  D Yes Historical Provider, MD        Social History     Tobacco Use    Smoking status: Former Smoker     Packs/day: 0.50     Years: 30.00     Pack years: 15.00     Types: Cigarettes     Start date: 1976     Last attempt to quit: 2014     Years since quittin.6    Smokeless tobacco: Never Used   Substance Use Topics    Alcohol use: No        Vitals:    20 1333   BP: 118/80   Site: Left Upper Arm   Position: Sitting   Cuff Size: Medium Adult   Pulse: 92   Resp: 18   Temp: 97.9 °F (36.6 °C)   SpO2: 98%   Weight: 154 lb 6.4 oz (70 kg)   Height: 5' 2.5\" (1.588 m)     Estimated body mass index is 27.79 kg/m² as calculated from the following:    Height as of this encounter: 5' 2.5\" (1.588 m). Weight as of this encounter: 154 lb 6.4 oz (70 kg). Physical Exam  Vitals signs reviewed. Constitutional:       General: She is not in acute distress. Appearance: Normal appearance. She is not ill-appearing, toxic-appearing or diaphoretic. HENT:      Head: Normocephalic and atraumatic. Neck:      Musculoskeletal: Normal range of motion and neck supple. Cardiovascular:      Rate and Rhythm: Normal rate and regular rhythm. Pulses: Normal pulses. Heart sounds: Normal heart sounds. No murmur. Pulmonary:      Effort: Pulmonary effort is normal.      Breath sounds: Normal breath sounds. Lymphadenopathy:      Cervical: No cervical adenopathy. Skin:     General: Skin is warm and dry. Findings: Erythema and rash present. Rash is purpuric and vesicular. Comments: Linear pattern under right breast. Scattered erythema  vesicle,. No weeping to  pain noted. Neurological:      General: No focal deficit present. Mental Status: She is alert. Psychiatric:         Mood and Affect: Mood normal.         Behavior: Behavior normal.         Thought Content: Thought content normal.         Judgment: Judgment normal.         ASSESSMENT/PLAN:  1. Poison ivy dermatitis  patient care instruction given to patient for review.    Reviewed prednisone taper with patient - verbalizes an understanding  Instructed to take Claritin or benadryl for itching  - predniSONE (DELTASONE) 20 MG tablet; Taper dose: take 3 tablets by mouth daily x 4 days, then 2 tablets by mouth daily for 4 days , then 1 tablet by mouth daily x 4 days, then 1/2 tablet by mouth daily x 4 days. Dispense: 36 tablet; Refill: 0  - triamcinolone (KENALOG) 0.1 % ointment; Apply topically 2 times daily for 7 days  Dispense: 1 Tube; Refill: 0      Return if symptoms worsen or fail to improve. All questions addresses and answered . Patient agrees with plan of care. An electronic signature was used to authenticate this note.     --MINESH Persaud - CNP on 8/14/2020 at 1:59 PM

## 2020-08-14 NOTE — TELEPHONE ENCOUNTER
Reason for Disposition   Severe Poison Ivy reaction in the past    Answer Assessment - Initial Assessment Questions  1. APPEARANCE of RASH: \"Describe the rash. \"       Red raised, blisters, patchy  2. LOCATION: \"Where is the rash located? \"       On chest, neck, buttocks, legs and Right arm  3. SIZE: \"How large is the rash? \"       Chest is large as hand  4. ONSET: \"When did the rash begin? \"       8/9/2020  5. ITCHING: \"Does the rash itch? \" If so, ask: \"How bad is it? \"    - MILD - doesn't interfere with normal activities    - MODERATE-SEVERE: interferes with work, school, sleep, or other activities       moderate  6. PREGNANCY: \"Is there any chance you are pregnant? \" \"When was your last menstrual period? \"      n/a    Protocols used: POISON IVY - [place] - [place]-ADULT-[place]    Caller states pulled weeds on 8/9/2020 and started with a rash. Caller states it is on her neck, chest, legs, buttocks and right arm. Caller states itching is moderate but has needed medicine in the past to help with rash. No difficulty breathing. Recommendation See today in office. Transferred to [facility] for an appointment.

## 2020-08-14 NOTE — PATIENT INSTRUCTIONS
X Pre-Contact Skin Solution, come in lotions, sprays, or towelettes. You put the product on your skin right before you go outdoors. · If you did not use a preventive product and you have had contact with plant oil, clean it off your skin as soon as possible. Use a product such as Tecnu Original Outdoor Skin Cleanser. These products can also be used to clean plant oil from clothing or tools. When should you call for help? Call your doctor now or seek immediate medical care if:  · Your rash gets worse, and you start to feel bad and have a fever, a stiff neck, nausea, and vomiting. · You have signs of infection, such as:  ? Increased pain, swelling, warmth, or redness. ? Red streaks leading from the rash. ? Pus draining from the rash. ? A fever. Watch closely for changes in your health, and be sure to contact your doctor if:  · You have new blisters or bruises, or the rash spreads and looks like a sunburn. · The rash gets worse, or it comes back after nearly disappearing. · You think a medicine you are using is making your rash worse. · Your rash does not clear up after 1 to 2 weeks of home treatment. · You have joint aches or body aches with your rash. Where can you learn more? Go to https://Bazaarvoice.[employer]. org and sign in to your Provade account. Enter V918 in the [facility] box to learn more about \"Poison [place], [place], and [place]: Care Instructions. \"     If you do not have an account, please click on the \"Sign Up Now\" link. Current as of: October 31, 2019               Content Version: 12.5  © 5157-9258 Healthwise, Incorporated. Care instructions adapted under license by [facility] ([facility]). If you have questions about a medical condition or this instruction, always ask your healthcare professional. [address] any warranty or liability for your use of this information.

## 2021-01-08 ENCOUNTER — OFFICE VISIT (OUTPATIENT)
Dept: INTERNAL MEDICINE CLINIC | Age: 61
End: 2021-01-08
Payer: MEDICAID

## 2021-01-08 ENCOUNTER — TELEPHONE (OUTPATIENT)
Dept: INTERNAL MEDICINE CLINIC | Age: 61
End: 2021-01-08

## 2021-01-08 ENCOUNTER — NURSE TRIAGE (OUTPATIENT)
Dept: OTHER | Facility: CLINIC | Age: 61
End: 2021-01-08

## 2021-01-08 VITALS
DIASTOLIC BLOOD PRESSURE: 68 MMHG | OXYGEN SATURATION: 98 % | HEART RATE: 79 BPM | RESPIRATION RATE: 18 BRPM | WEIGHT: 155 LBS | SYSTOLIC BLOOD PRESSURE: 114 MMHG | BODY MASS INDEX: 27.9 KG/M2 | TEMPERATURE: 97.4 F

## 2021-01-08 DIAGNOSIS — K21.00 GASTROESOPHAGEAL REFLUX DISEASE WITH ESOPHAGITIS, UNSPECIFIED WHETHER HEMORRHAGE: Primary | Chronic | ICD-10-CM

## 2021-01-08 DIAGNOSIS — Z23 NEED FOR SHINGLES VACCINE: ICD-10-CM

## 2021-01-08 PROCEDURE — 3017F COLORECTAL CA SCREEN DOC REV: CPT | Performed by: NURSE PRACTITIONER

## 2021-01-08 PROCEDURE — G8482 FLU IMMUNIZE ORDER/ADMIN: HCPCS | Performed by: NURSE PRACTITIONER

## 2021-01-08 PROCEDURE — G8419 CALC BMI OUT NRM PARAM NOF/U: HCPCS | Performed by: NURSE PRACTITIONER

## 2021-01-08 PROCEDURE — 1036F TOBACCO NON-USER: CPT | Performed by: NURSE PRACTITIONER

## 2021-01-08 PROCEDURE — 90471 IMMUNIZATION ADMIN: CPT | Performed by: NURSE PRACTITIONER

## 2021-01-08 PROCEDURE — G8427 DOCREV CUR MEDS BY ELIG CLIN: HCPCS | Performed by: NURSE PRACTITIONER

## 2021-01-08 PROCEDURE — 90750 HZV VACC RECOMBINANT IM: CPT | Performed by: NURSE PRACTITIONER

## 2021-01-08 PROCEDURE — 99213 OFFICE O/P EST LOW 20 MIN: CPT | Performed by: NURSE PRACTITIONER

## 2021-01-08 RX ORDER — PANTOPRAZOLE SODIUM 40 MG/1
40 TABLET, DELAYED RELEASE ORAL
Qty: 30 TABLET | Refills: 5 | Status: SHIPPED | OUTPATIENT
Start: 2021-01-08 | End: 2021-09-13 | Stop reason: SDUPTHER

## 2021-01-08 SDOH — ECONOMIC STABILITY: TRANSPORTATION INSECURITY
IN THE PAST 12 MONTHS, HAS LACK OF TRANSPORTATION KEPT YOU FROM MEETINGS, WORK, OR FROM GETTING THINGS NEEDED FOR DAILY LIVING?: NO

## 2021-01-08 SDOH — ECONOMIC STABILITY: FOOD INSECURITY: WITHIN THE PAST 12 MONTHS, THE FOOD YOU BOUGHT JUST DIDN'T LAST AND YOU DIDN'T HAVE MONEY TO GET MORE.: NEVER TRUE

## 2021-01-08 ASSESSMENT — ENCOUNTER SYMPTOMS
HEARTBURN: 0
COUGH: 1
VOICE CHANGE: 0
DIARRHEA: 0
SORE THROAT: 0
CHEST TIGHTNESS: 0
TROUBLE SWALLOWING: 0
CONSTIPATION: 0
HOARSE VOICE: 0
CHOKING: 0
NAUSEA: 0
WHEEZING: 0
VOMITING: 0
SHORTNESS OF BREATH: 0
ABDOMINAL PAIN: 0

## 2021-01-08 ASSESSMENT — PATIENT HEALTH QUESTIONNAIRE - PHQ9
SUM OF ALL RESPONSES TO PHQ QUESTIONS 1-9: 0
SUM OF ALL RESPONSES TO PHQ QUESTIONS 1-9: 0

## 2021-01-08 NOTE — TELEPHONE ENCOUNTER
Reason for Disposition   Nursing judgment    Protocols used: NO GUIDELINE OR REFERENCE AVAILABLE-ADULT-AH    Pt reports worsening GERD for last 3-4 months. States chronic coughing and clearing throat, burning sensation in throat. States prilosec prescribed is no longer working. Has tried other OTC meds/TUMS without relief. Reviewed for pt to have next available appt with PCP. Warm transfer to [facility] in [address] [place] [address] provided care advice and instructed to call back with worsening symptoms. Attention Provider: Thank you for allowing me to participate in the care of your patient. The patient was connected to triage in response to information provided to the [facility]. Please do not respond through this encounter as the response is not directed to a shared pool.

## 2021-01-08 NOTE — TELEPHONE ENCOUNTER
----- Message from [address] sent at 1/8/2021 10:48 AM EST -----  Subject: Appointment Request    Reason for Call: Semi-Routine Return from RN Triage    QUESTIONS  Type of Appointment? Established Patient  Reason for appointment request? No appointments available during search  Additional Information for Provider? No availability. PT was returned from [facility]. wants to be seen sooner than Feb 15th. Acid ahprg1t trouble for 3/4   months burning sensation in throat trouble eating food   medicine prescribed no longer working   ---------------------------------------------------------------------------  --------------  [address]  What is the best way for the office to contact you? OK to leave message on   voicemail  Preferred Call Back Phone Number? [phone]  ---------------------------------------------------------------------------  --------------  SCRIPT ANSWERS  Patient needs to be seen within 5 days? Yes  Nurse Name? Osei Peters  (Did RN indicate the need for Red Scheduling?)? No  Have you been diagnosed with COVID-19 (Coronavirus)   tested for COVID-19 (Coronavirus)   or told that you are suspected of having COVID-19 (Coronavirus)? No  Have you had a fever or taken medication to treat a fever within the past   3 days? No  Have you had a cough   shortness of breath or flu-like symptoms within the past 3 days? No  Do you currently have flu-like symptoms including fever or chills   cough   shortness of breath   or difficulty breathing   or new loss of taste or smell? No  (Service Expert - click yes below to proceed with [employer] As Usual   Scheduling)?  Yes

## 2021-01-08 NOTE — PATIENT INSTRUCTIONS
Patient Education        Gastroesophageal Reflux Disease (GERD): Care Instructions  Overview     Gastroesophageal reflux disease (GERD) is the backward flow of stomach acid into the esophagus. The esophagus is the tube that leads from your throat to your stomach. A one-way valve prevents the stomach acid from backing up into this tube. But when you have GERD, this valve does not close tightly enough. This can also cause pain and swelling in your esophagus. (This is called esophagitis.)  If you have mild GERD symptoms including heartburn, you may be able to control the problem with antacids or over-the-counter medicine. You can also make lifestyle changes to help reduce your symptoms. These include changing your diet and eating habits, such as not eating late at night and losing weight. Follow-up care is a key part of your treatment and safety. Be sure to make and go to all appointments, and call your doctor if you are having problems. It's also a good idea to know your test results and keep a list of the medicines you take. How can you care for yourself at home? · Take your medicines exactly as prescribed. Call your doctor if you think you are having a problem with your medicine. · Your doctor may recommend over-the-counter medicine. For mild or occasional indigestion, antacids, such as Tums, Gaviscon, Mylanta, or Maalox, may help. Your doctor also may recommend over-the-counter acid reducers, such as famotidine (Pepcid AC), cimetidine (Tagamet HB), or omeprazole (Prilosec). Read and follow all instructions on the label. If you use these medicines often, talk with your doctor. · Change your eating habits. ? It's best to eat several small meals instead of two or three large meals. ? After you eat, wait 2 to 3 hours before you lie down. ? Chocolate, mint, and alcohol can make GERD worse. ? Spicy foods, foods that have a lot of acid (like tomatoes and oranges), and coffee can make GERD symptoms worse in some people. If your symptoms are worse after you eat a certain food, you may want to stop eating that food to see if your symptoms get better. · Do not smoke or chew tobacco. Smoking can make GERD worse. If you need help quitting, talk to your doctor about stop-smoking programs and medicines. These can increase your chances of quitting for good. · If you have GERD symptoms at night, raise the head of your bed 6 to 8 inches by putting the frame on blocks or placing a foam wedge under the head of your mattress. (Adding extra pillows does not work.)  · Do not wear tight clothing around your middle. · Lose weight if you need to. Losing just 5 to 10 pounds can help. When should you call for help? Call your doctor now or seek immediate medical care if:    · You have new or different belly pain.     · Your stools are black and tarlike or have streaks of blood. Watch closely for changes in your health, and be sure to contact your doctor if:    · Your symptoms have not improved after 2 days.     · Food seems to catch in your throat or chest.   Where can you learn more? Go to https://WildBlue.[employer]. org and sign in to your [employer] account. Enter P571 in the [employer] box to learn more about \"Gastroesophageal Reflux Disease (GERD): Care Instructions. \"     If you do not have an account, please click on the \"Sign Up Now\" link. Current as of: April 15, 2020               Content Version: 12.6  © 1057-7596 [employer] Incorporated. Care instructions adapted under license by [facility] ([facility]). If you have questions about a medical condition or this instruction, always ask your healthcare professional. [address] any warranty or liability for your use of this information.

## 2021-01-08 NOTE — PROGRESS NOTES
2021     Miryam Randle (:  1960) is a 62 y.o. female, here for evaluation of the following medical concerns:    Chief Complaint   Patient presents with    Gastroesophageal Reflux     has tried prilosec, tums, pepcid       Gastroesophageal Reflux  She complains of coughing and early satiety. She reports no abdominal pain, no chest pain, no choking, no dysphagia, no heartburn, no hoarse voice, no nausea, no sore throat or no wheezing. This is a chronic problem. The current episode started more than 1 year ago. The problem occurs frequently. The problem has been gradually worsening. Exacerbated by: all foods  Pertinent negatives include no fatigue, muscle weakness, orthopnea or weight loss. Risk factors include smoking/tobacco exposure. She has tried head elevation, an antacid, a PPI and an herbal remedy for the symptoms. The treatment provided mild relief. GERD:   Patient states that in the last 3-4 months she has been having worsening acid reflux symptoms. She notes a burning in throat , reflux of fluid, clearing throat ( cough ) feeling. She has tried TUMs, gut alive. She states that she was having relief from symptoms with GUT ALIVE then 3 weeks ago it came back . She denies any food triggers. Does smoke at times but not daily. She dos elevate HOB at night. Denies any chest pain, N/V, sore throat, or voice changes. She has not been taking Prilosec as directed because it stopped working. Review of Systems   Constitutional: Positive for appetite change (garcia snot want to eat as much due to symptoms). Negative for chills, fatigue, fever, unexpected weight change and weight loss. HENT: Negative for hoarse voice, sore throat, trouble swallowing and voice change. Respiratory: Positive for cough. Negative for choking, chest tightness, shortness of breath and wheezing. Cardiovascular: Negative for chest pain and palpitations. Gastrointestinal: Negative for abdominal pain, constipation, diarrhea, dysphagia, heartburn, nausea and vomiting. Musculoskeletal: Negative for muscle weakness. Prior to Visit Medications    Medication Sig Taking? Authorizing Provider   Nutritional Supplements (NUTRITIONAL SUPPLEMENT PO) Take 1 capsule by mouth 2 times daily GUT ALIVE Yes Historical Provider, MD   pantoprazole (PROTONIX) 40 MG tablet Take 1 tablet by mouth every morning (before breakfast) Yes Jennifer Krishnan, APRN - CNP   Misc Natural Products (SUPER GREENS PO) Take 2 capsules by mouth daily Yes Historical Provider, MD   ASTAXANTHIN PO Take 1 Bar by mouth daily Yes Historical Provider, MD   calcium carbonate (TUMS) 500 MG chewable tablet Take 1 tablet by mouth daily Yes Historical Provider, MD   valACYclovir (VALTREX) 500 MG tablet TK 1 T PO  D Yes Historical Provider, MD        Social History     Tobacco Use    Smoking status: Former Smoker     Packs/day: 0.50     Years: 30.00     Pack years: 15.00     Types: Cigarettes     Start date: 1976     Quit date: 2014     Years since quittin.0    Smokeless tobacco: Never Used   Substance Use Topics    Alcohol use: No        Vitals:    21 1330   BP: 114/68   Site: Right Upper Arm   Position: Sitting   Cuff Size: Medium Adult   Pulse: 79   Resp: 18   Temp: 97.4 °F (36.3 °C)   SpO2: 98%   Weight: 155 lb (70.3 kg)     Estimated body mass index is 27.9 kg/m² as calculated from the following:    Height as of 20: 5' 2.5\" (1.588 m). Weight as of this encounter: 155 lb (70.3 kg). Physical Exam  Vitals signs reviewed. Constitutional:       General: She is not in acute distress. Appearance: Normal appearance. She is normal weight. She is not ill-appearing, toxic-appearing or diaphoretic. HENT:      Head: Normocephalic and atraumatic. Mouth/Throat:      Mouth: Mucous membranes are moist.      Pharynx: No posterior oropharyngeal erythema.    Neck: Musculoskeletal: Normal range of motion and neck supple. Cardiovascular:      Rate and Rhythm: Normal rate and regular rhythm. Pulses: Normal pulses. Heart sounds: Normal heart sounds. Pulmonary:      Effort: Pulmonary effort is normal.      Breath sounds: Normal breath sounds. Abdominal:      General: Bowel sounds are normal. There is no distension. Palpations: Abdomen is soft. There is no mass. Tenderness: There is no abdominal tenderness. There is no guarding or rebound. Hernia: No hernia is present. Skin:     General: Skin is warm and dry. Neurological:      General: No focal deficit present. Mental Status: She is alert and oriented to person, place, and time. Psychiatric:         Mood and Affect: Mood normal.         Behavior: Behavior normal.         ASSESSMENT/PLAN:  1. Gastroesophageal reflux disease with esophagitis, unspecified whether hemorrhage,   instructed to start Protonix and stop Prilosec  Instructed to avoid foods citrus, spicy , fried , alcohol , caffeine , chocolate , mint   Sleep with HOB elevated. Instructed to kathryn and schedule a GI consult for EGD  - [facility] - Azul Tidwell MD, Gastroenterology, [facility]  - pantoprazole (PROTONIX) 40 MG tablet; Take 1 tablet by mouth every morning (before breakfast)  Dispense: 30 tablet; Refill: 5    2. Need for shingles vaccine    - Zoster recombinant [facility])      Return in about 4 weeks (around 2/5/2021) for establish care with DR Rosalinda Lu. All questions addresses and answered . Patient agrees with plan of care. An electronic signature was used to authenticate this note.     --MINESH Avalos - CNP on 1/8/2021 at 4:10 PM

## 2021-03-04 ENCOUNTER — TELEPHONE (OUTPATIENT)
Dept: INTERNAL MEDICINE CLINIC | Age: 61
End: 2021-03-04

## 2021-03-04 NOTE — TELEPHONE ENCOUNTER
----- Message from Kendy Maharaj sent at 3/4/2021 12:09 PM EST -----  Subject: Message to Provider    QUESTIONS  Information for Provider? Patient would like to schedule an appointment   for her second shingles vaccine. She needs a lab visit only. ---------------------------------------------------------------------------  --------------  Elie Iotera ROBERTO  What is the best way for the office to contact you? OK to leave message on   voicemail  Preferred Call Back Phone Number? [phone]  ---------------------------------------------------------------------------  --------------  SCRIPT ANSWERS  Relationship to Patient?  Self

## 2021-04-08 ENCOUNTER — NURSE ONLY (OUTPATIENT)
Dept: INTERNAL MEDICINE CLINIC | Age: 61
End: 2021-04-08
Payer: MEDICAID

## 2021-04-08 DIAGNOSIS — Z23 NEED FOR SHINGLES VACCINE: Primary | ICD-10-CM

## 2021-04-08 PROCEDURE — 90750 HZV VACC RECOMBINANT IM: CPT | Performed by: INTERNAL MEDICINE

## 2021-04-08 PROCEDURE — 90471 IMMUNIZATION ADMIN: CPT | Performed by: INTERNAL MEDICINE

## 2021-08-04 ENCOUNTER — TELEPHONE (OUTPATIENT)
Dept: INTERNAL MEDICINE CLINIC | Age: 61
End: 2021-08-04

## 2021-08-04 NOTE — TELEPHONE ENCOUNTER
Pt has [facility] down through [facility]. States she typically does not need a referral.  Pt said if she doesn't hear from them she will give us a call back to get one completed through [facility].

## 2021-09-13 ENCOUNTER — TELEPHONE (OUTPATIENT)
Dept: INTERNAL MEDICINE CLINIC | Age: 61
End: 2021-09-13

## 2021-09-13 ENCOUNTER — OFFICE VISIT (OUTPATIENT)
Dept: INTERNAL MEDICINE CLINIC | Age: 61
End: 2021-09-13
Payer: MEDICAID

## 2021-09-13 DIAGNOSIS — J01.10 ACUTE NON-RECURRENT FRONTAL SINUSITIS: Primary | ICD-10-CM

## 2021-09-13 DIAGNOSIS — K21.00 GASTROESOPHAGEAL REFLUX DISEASE WITH ESOPHAGITIS, UNSPECIFIED WHETHER HEMORRHAGE: Chronic | ICD-10-CM

## 2021-09-13 PROCEDURE — G8427 DOCREV CUR MEDS BY ELIG CLIN: HCPCS | Performed by: NURSE PRACTITIONER

## 2021-09-13 PROCEDURE — 99213 OFFICE O/P EST LOW 20 MIN: CPT | Performed by: NURSE PRACTITIONER

## 2021-09-13 PROCEDURE — 1036F TOBACCO NON-USER: CPT | Performed by: NURSE PRACTITIONER

## 2021-09-13 PROCEDURE — G8419 CALC BMI OUT NRM PARAM NOF/U: HCPCS | Performed by: NURSE PRACTITIONER

## 2021-09-13 PROCEDURE — 3017F COLORECTAL CA SCREEN DOC REV: CPT | Performed by: NURSE PRACTITIONER

## 2021-09-13 RX ORDER — AMOXICILLIN AND CLAVULANATE POTASSIUM 875; 125 MG/1; MG/1
1 TABLET, FILM COATED ORAL 2 TIMES DAILY
Qty: 20 TABLET | Refills: 0 | Status: SHIPPED | OUTPATIENT
Start: 2021-09-13 | End: 2021-09-23

## 2021-09-13 RX ORDER — PANTOPRAZOLE SODIUM 40 MG/1
40 TABLET, DELAYED RELEASE ORAL
Qty: 30 TABLET | Refills: 5 | Status: SHIPPED | OUTPATIENT
Start: 2021-09-13 | End: 2022-03-24 | Stop reason: SDUPTHER

## 2021-09-13 RX ORDER — FLUTICASONE PROPIONATE 50 MCG
2 SPRAY, SUSPENSION (ML) NASAL DAILY
Qty: 48 G | Refills: 1 | Status: SHIPPED | OUTPATIENT
Start: 2021-09-13 | End: 2022-04-15

## 2021-09-13 ASSESSMENT — ENCOUNTER SYMPTOMS
NAUSEA: 0
SINUS PRESSURE: 1
SINUS PAIN: 1
COUGH: 1
SORE THROAT: 1
FACIAL SWELLING: 0
SHORTNESS OF BREATH: 0
TROUBLE SWALLOWING: 0

## 2021-09-13 NOTE — PROGRESS NOTES
2021    TELEHEALTH EVALUATION -- Audio/Visual ([facility])    HPI:    Bryanna Norton (:  1960) has requested an audio/video evaluation for the following concern(s): sinus issues x 2 weeks     Patient states that she has had sinus congestion and pressure x 2 weeks mostly to the frontal area and cheeks. + increase tiredness. She has also noted a dry throat in the last 3 days. She did complete a home covid test on Friday and it was negative, She has been using warm compresses, neti pot and aleve cold and sinus with minimal relief of symptoms. She denies any fevers, chills, ear pain or sore throat. She is tolerating po's and solids. She has not missed work due to illness. She also asked for her GERD medication to be refilled . She has seen GI and he has been prescribing Protonix, she has not been able to reach him. Review of Systems   Constitutional: Positive for fatigue. Negative for appetite change, chills, fever and unexpected weight change. HENT: Positive for congestion, postnasal drip, sinus pressure, sinus pain and sore throat (scratchy ). Negative for ear discharge, ear pain, facial swelling and trouble swallowing. Respiratory: Positive for cough (due to GERD , not worsening). Negative for shortness of breath. Cardiovascular: Negative for chest pain and palpitations. Gastrointestinal: Negative for nausea. Musculoskeletal: Negative for myalgias. Neurological: Positive for headaches (frontal congestion ). Negative for dizziness and numbness. Prior to Visit Medications    Medication Sig Taking?  Authorizing Provider   pantoprazole (PROTONIX) 40 MG tablet Take 1 tablet by mouth every morning (before breakfast) Yes MINESH Duron CNP   amoxicillin-clavulanate (AUGMENTIN) 875-125 MG per tablet Take 1 tablet by mouth 2 times daily for 10 days Yes MINESH Duron CNP   fluticasone (FLONASE) 50 MCG/ACT nasal spray 2 sprays by Each Nostril route daily Yes Jennifer Krishnan, APRN - CNP   Nutritional Supplements (NUTRITIONAL SUPPLEMENT PO) Take 1 capsule by mouth 2 times daily GUT ALIVE  Historical Provider, MD   Misc Natural Products (SUPER GREENS PO) Take 2 capsules by mouth daily  Historical Provider, MD   ASTAXANTHIN PO Take 1 Bar by mouth daily  Historical Provider, MD   calcium carbonate (TUMS) 500 MG chewable tablet Take 1 tablet by mouth daily  Historical Provider, MD   valACYclovir (VALTREX) 500 MG tablet TK 1 T PO  D  Historical Provider, MD       Social History     Tobacco Use    Smoking status: Former Smoker     Packs/day: 0.50     Years: 30.00     Pack years: 15.00     Types: Cigarettes     Start date: 1976     Quit date: 2014     Years since quittin.7    Smokeless tobacco: Never Used   Substance Use Topics    Alcohol use: No    Drug use: Yes     Types: Marijuana     Comment: 4-5 times per week         Allergies   Allergen Reactions    Codeine Nausea And Vomiting       PHYSICAL EXAMINATION:  [ INSTRUCTIONS:  \"[x]\" Indicates a positive item  \"[]\" Indicates a negative item  -- DELETE ALL ITEMS NOT EXAMINED]      Constitutional: [x] Appears well-developed and well-nourished [x] No apparent distress        Mental status  [x] Alert and awake  [x] Oriented to person/place/time [x]Able to follow commands      Eyes:  EOM    [x]  Normal    Sclera  [x]  Normal           Discharge [x]  None visible      HENT:   [x] Normocephalic, atraumatic.   [x] Mouth/Throat: Mucous membranes are moist.     External Ears [x] Normal      Neck: [x] No visualized mass     Pulmonary/Chest: [x] Respiratory effort normal.  [x] No visualized signs of difficulty breathing or respiratory distress         Musculoskeletal:           [x] Normal range of motion of neck            Neurological:        [x] No Facial Asymmetry (Cranial nerve 7 motor function) (limited exam to video visit)          [x] No gaze palsy               Skin:        [x] No significant exanthematous lesions or discoloration noted on facial skin                  Psychiatric:       [x] Normal Affect [x] No Hallucinations       Other pertinent observable physical exam findings- color pink. + pressure to frontal and cheeks with self exam.     ASSESSMENT/PLAN:  1. Acute non-recurrent frontal sinusitis   Patient instructions:  Increase fluid intake   Breathe warm moist heat   May take mucinex for the cough . Start OTC Flonase 2 sprays daily  . May use saline spray to help with dry nasal passages. Tylenol or motrin for discomfort  Start prescribed antibiotics:  - amoxicillin-clavulanate (AUGMENTIN) 875-125 MG per tablet; Take 1 tablet by mouth 2 times daily for 10 days  Dispense: 20 tablet; Refill: 0  - fluticasone (FLONASE) 50 MCG/ACT nasal spray; 2 sprays by Each Nostril route daily  Dispense: 48 g; Refill: 1    2. Gastroesophageal reflux disease with esophagitis, unspecified whether hemorrhage, stable   Refilled Protonix per request   Continue f/u with GI   - pantoprazole (PROTONIX) 40 MG tablet; Take 1 tablet by mouth every morning (before breakfast)  Dispense: 30 tablet; Refill: 5    If you develop worsening or concerning symptoms ( fevers, SOB, difficulty breathing , chest pain , etc)   go to the ER ASAP    Aware that if not improving in 72 hours need to be [facility] for [facility] exam and COVID PCR test. Patient verbalizes an understanding and agrees with plan of care. Return if symptoms worsen or fail to improve. Edie Haynes, was evaluated through a synchronous (real-time) audio-video encounter. The patient (or guardian if applicable) is aware that this is a billable service. Verbal consent to proceed has been obtained within the past 12 months. The visit was conducted pursuant to the emergency declaration under the [facility], [facility] authority and the [employer] and [employer] Act.   Patient

## 2021-09-13 NOTE — PATIENT INSTRUCTIONS
Patient instructions:  Increase fluid intake   Breathe warm moist heat   May take mucinex for the cough . Start OTC Flonase 2 sprays daily for the next 2-3 months . May use saline spray to help with dry nasal passages.   Tylenol or motrin for discomfort  Start prescribed antibiotics:

## 2021-09-13 NOTE — TELEPHONE ENCOUNTER
Spoke to PT and says that she took an at home covid test and says that it resulted in a negative test but would like to be seen. Would like to know if possible for a VV.      ----- Message from Therese Soto sent at 9/13/2021 12:44 PM EDT -----  Subject: Appointment Request    Reason for Call: Semi-Routine Cough, Cold Symptoms    QUESTIONS  Type of Appointment? Established Patient  Reason for appointment request? No appointments available during search  Additional Information for Provider? Pt sees Dr. Alyssia Miller and reports   symptoms of a sinus infection? sinus headache, sore throat, runny/stuffy   nose for about 2 weeks. ---------------------------------------------------------------------------  --------------  Mita MEJIA  What is the best way for the office to contact you? OK to leave message on   voicemail  Preferred Call Back Phone Number? [phone]  ---------------------------------------------------------------------------  --------------  SCRIPT ANSWERS  Relationship to Patient? Self  Are you currently unable to finish sentences due to any difficulty   breathing? No  Are you unable to swallow liquids? No  Are you having fevers (100.4 or greater), chills, or sweats? No  Do you have COPD, asthma or a chronic lung condition? No  Have your symptoms been present for more than 5 days? No  Have you recently (14 days) been seen by a provider for this issue? No  Have you been diagnosed with, awaiting test results for, or told that you   are suspected of having COVID-19 (Coronavirus)? (If patient has tested   negative or was tested as a requirement for work, school, or travel and   not based on symptoms, answer no)? No  Within the past two weeks have you developed any of the following symptoms   (answer no if symptoms have been present longer than 2 weeks or began   more than 2 weeks ago)?  Fever or Chills, Cough, Shortness of breath or   difficulty breathing, Loss of taste or smell, Sore throat, Nasal congestion, Sneezing or runny nose, Fatigue or generalized body aches   (answer no if pain is specific to a body part e.g. back pain), Diarrhea,   Headache?  Yes

## 2022-02-17 ENCOUNTER — OFFICE VISIT (OUTPATIENT)
Dept: ORTHOPEDIC SURGERY | Age: 62
End: 2022-02-17
Payer: MEDICAID

## 2022-02-17 VITALS — BODY MASS INDEX: 26.58 KG/M2 | HEIGHT: 63 IN | WEIGHT: 150 LBS

## 2022-02-17 DIAGNOSIS — M75.41 IMPINGEMENT SYNDROME OF RIGHT SHOULDER: ICD-10-CM

## 2022-02-17 DIAGNOSIS — M25.511 CHRONIC RIGHT SHOULDER PAIN: Primary | ICD-10-CM

## 2022-02-17 DIAGNOSIS — G89.29 CHRONIC RIGHT SHOULDER PAIN: Primary | ICD-10-CM

## 2022-02-17 PROCEDURE — G8427 DOCREV CUR MEDS BY ELIG CLIN: HCPCS | Performed by: ORTHOPAEDIC SURGERY

## 2022-02-17 PROCEDURE — G8419 CALC BMI OUT NRM PARAM NOF/U: HCPCS | Performed by: ORTHOPAEDIC SURGERY

## 2022-02-17 PROCEDURE — 1036F TOBACCO NON-USER: CPT | Performed by: ORTHOPAEDIC SURGERY

## 2022-02-17 PROCEDURE — 3017F COLORECTAL CA SCREEN DOC REV: CPT | Performed by: ORTHOPAEDIC SURGERY

## 2022-02-17 PROCEDURE — 99203 OFFICE O/P NEW LOW 30 MIN: CPT | Performed by: ORTHOPAEDIC SURGERY

## 2022-02-17 PROCEDURE — 20610 DRAIN/INJ JOINT/BURSA W/O US: CPT | Performed by: ORTHOPAEDIC SURGERY

## 2022-02-17 PROCEDURE — G8484 FLU IMMUNIZE NO ADMIN: HCPCS | Performed by: ORTHOPAEDIC SURGERY

## 2022-02-17 RX ORDER — METHYLPREDNISOLONE ACETATE 40 MG/ML
80 INJECTION, SUSPENSION INTRA-ARTICULAR; INTRALESIONAL; INTRAMUSCULAR; SOFT TISSUE ONCE
Status: COMPLETED | OUTPATIENT
Start: 2022-02-17 | End: 2022-02-17

## 2022-02-17 RX ORDER — LIDOCAINE HYDROCHLORIDE 10 MG/ML
4 INJECTION, SOLUTION INFILTRATION; PERINEURAL ONCE
Status: COMPLETED | OUTPATIENT
Start: 2022-02-17 | End: 2022-02-17

## 2022-02-17 RX ADMIN — METHYLPREDNISOLONE ACETATE 80 MG: 40 INJECTION, SUSPENSION INTRA-ARTICULAR; INTRALESIONAL; INTRAMUSCULAR; SOFT TISSUE at 09:46

## 2022-02-17 RX ADMIN — LIDOCAINE HYDROCHLORIDE 4 ML: 10 INJECTION, SOLUTION INFILTRATION; PERINEURAL at 09:46

## 2022-02-17 NOTE — PROGRESS NOTES
Víctor Boogie presents today for evaluation of ongoing right shoulder pain. Pain started about 6 months ago. She does not recall specific trauma. 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 She does not wake up at night with pain but has a hard time laying on her shoulder. She denies trauma and has not had treatment. She does have fibromyalgia but is well controlled. She uses some ibuprofen and ice. Pain can be 5 out of 10. She has some mild pain in the left shoulder as well. History: Patient's relevant past family, medical, and social history are reviewed as part of today's visit. ROS of pertinent positives and negatives as above; otherwise negative. General Exam:    Vitals: Height 5' 2.5\" (1.588 m), weight 150 lb (68 kg), last menstrual period 10/03/2011, not currently breastfeeding. Constitutional: Patient is adequately groomed with no evidence of malnutrition  Mental Status: The patient is oriented to time, place and person. The patient's mood and affect are appropriate. Gait:  Patient walks with normal gait and station. Lymphatic: The lymphatic examination bilaterally reveals all areas to be without enlargement or induration. Vascular: Examination reveals no swelling or calf tenderness. Peripheral pulses are palpable and 2+. Neurological: The patient has good coordination. There is no weakness or sensory deficit. Skin:    Head/Neck: inspection reveals no rashes, ulcerations or lesions. Trunk:  inspection reveals no rashes, ulcerations or lesions. Right Lower Extremity: inspection reveals no rashes, ulcerations or lesions. Left Lower Extremity: inspection reveals no rashes, ulcerations or lesions. Examination of the cervical spine reveals no restriction in motion. There are no reproduction of symptoms into either arm with flexion, extension, rotation or palpation. The patient has a negative Spurling sign, and no tenderness.     Right shoulder has some stiffness in elevation at about 160 degrees good strength throughout the cuff. She has pain with cross body adduction but no tenderness. Left shoulder has similar stiffness but no discomfort with adduction and good strength. She is stable bilaterally with normal neurovascular exam.    Xrays of the right shoulder were obtained today in the office and interpreted by me : AP in the scapular plane, axillary lateral, and scapular Y. These demonstrate: Sclerosis of the greater tuberosity with subacromial spurring. Assessment: Right shoulder impingement. Plan: At this point we will going to start with a cortisone injection a dedicated round of physical therapy. Procedure: Under sterile technique, the right shoulder was injected posteriorly into the subacromial space with 80 mg of Depo-Medrol and 40 mg of lidocaine. She tolerated that well.   She will see the therapist both shoulders and follow-up with me in 6 weeks

## 2022-02-21 ENCOUNTER — HOSPITAL ENCOUNTER (OUTPATIENT)
Dept: PHYSICAL THERAPY | Age: 62
Setting detail: THERAPIES SERIES
Discharge: HOME | End: 2022-02-21
Payer: MEDICAID

## 2022-02-21 PROCEDURE — 97161 PT EVAL LOW COMPLEX 20 MIN: CPT | Performed by: PHYSICAL THERAPIST

## 2022-02-21 NOTE — PLAN OF CARE
[address] [place] [address], [address] [place] [address]  Phone: [phone]   Fax: [phone]          Physical Therapy Certification    Dear Referring Practitioner: Kathryn Remy MD,    We had the pleasure of evaluating the following patient for physical therapy services at [address] [place] [address]. A summary of our findings can be found in the initial assessment below. This includes our plan of care. If you have any questions or concerns regarding these findings, please do not hesitate to contact me at the office phone number checked above. Thank you for the referral.       Physician Signature:_______________________________Date:__________________  By signing above (or electronic signature), therapists plan is approved by physician      Patient: Monet Xiong   : 1960   MRN: [id number]  Referring Physician: Referring Practitioner: Kathryn Remy MD      Evaluation Date: 2022      Medical Diagnosis Information:  Diagnosis: M75.41 (ICD-10-CM) - Impingement syndrome of right shoulder; M75.42 (ICD-10-CM) - Impingement syndrome of left shoulder;   Treatment Diagnosis: M25.511 (R), M25.512 (L) shoulder pain; M62.81 muscle weakness                                           Precautions/ Contra-indications: Fibromyalgia; history of golfers elbow B    C-SSRS Triggered by Intake questionnaire (Past 2 wk assessment):   [x] No, Questionnaire did not trigger screening.   [] Yes, Patient intake triggered further evaluation      [] C-SSRS Screening completed  [] PCP notified via Plan of Care  [] Emergency services notified     Latex Allergy:  [x]NO      []YES  Preferred Language for Healthcare:   [x]English       []other:    SUBJECTIVE: Patient stated complaint:  Pt presents for B shoulder pain. R is worse but also has pain L side. Her pain started 6-8 months ago. No trauma. She says pain has been getting worse.  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 Pt was given a cortisone injection in R shoulder at MD appt- helped some of the pain. She was told to start PT for both shoulders. Pain was lateral and anterior shoulder but now pain is more anterior [facility]. She thinks her L shoulder is starting to get sore from compensating and using this side more now. 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, reaching back behind her body in H abd, lifting arm overhead, reaching overhead to brush hair, carrying/lifting kids    Type: [x]Constant   [x]Intermittent- increases  []Radiating [x]Localized []other:     Numbness/Tingling: none    Functional Limitations/Impairments: [x]Lifting/reaching [x]Grooming [x]Carrying    [x]ADL's []Driving []Sports/Recreations   []Other:    Occupation/School: 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      OBJECTIVE:     CERV ROM     Cervical Flexion WNL    Cervical Extension WNL    Cervical SB Mild tightness into R UT with R sidebend; none with L sidebend    Cervical rotation Mild tightness in R UT with L rotation        ROM PROM AROM  Comment    L R L R    Flexion   160 156 mild pain    Abduction   180 180 difficult to return arm back to side    ER at side   80 77    ER at 90 abd   92 92    BB IR   T7 T9 mild tightness    IR at 90 abd   77 75    Other        Other             Strength L R Comment   Flexion 4+ 4    Abduction 4+ 4- pain    ER 4 4- pain    IR 5 4+ mild pain Supraspinatus      Upper Trap      Lower Trap      Mid Trap      Rhomboids      Biceps 5 5    Triceps 5 5    Horizontal Abduction 4+ 4-    Horizontal Adduction      Lats 4+ 4      Orthopedic Special Tests:   Special Tests Left Right   Apley Scratch IR:  ER:   Cross body: - IR:  ER:  Cross Body: -   Neer's     Full Can     Empty Can     Joselyn Kitchen - -   Nerve Tension Testing     Speed's - -   Valentine's      Spurling's     Repeated Scaption     Drop Arm (supraspinatus)     ER lag sign (infraspinatus)     Belly Press (subscapularis     Lift off (subscapularis)     Apprehension test     ER internal impingement test                          Reflexes/Sensation (myotomes/dermatomes): n/t    Joint mobility: n/t   []Normal    []Hypo   []Hyper    Palpation: tender at R coracoid process    Functional Mobility/Transfers: Indep    Posture: mild forward rounded shoulders    Bandages/Dressings/Incisions: n/a    Gait: (include devices/WB status) Indep              Review Of Systems (ROS):  [x]Performed Review of systems (Integumentary, CardioPulmonary, Neurological) by intake and observation. Intake form has been scanned into medical record. Patient has been instructed to contact their primary care physician regarding ROS issues if not already being addressed at this time.       Co-morbidities/Complexities (which will affect course of rehabilitation):   []None           Arthritic conditions   []Rheumatoid arthritis (M05.9)  []Osteoarthritis (M19.91)   Cardiovascular conditions   []Hypertension (I10)  []Hyperlipidemia (E78.5)  []Angina pectoris (I20)  []Atherosclerosis (I70)   Musculoskeletal conditions   []Disc pathology   []Congenital spine pathologies   []Prior surgical intervention  []Osteoporosis (M81.8)  []Osteopenia (M85.8)   Endocrine conditions   []Hypothyroid (E03.9)  []Hyperthyroid Gastrointestinal conditions   []Constipation (D72.24)   Metabolic conditions   []Morbid obesity (E66.01)  []Diabetes type 1(E10.65) or 2 (E11.65)   []Neuropathy (G60.9)     Pulmonary conditions   []Asthma (J45)  []Coughing   []COPD (J44.9)   Psychological Disorders  []Anxiety (F41.9)  []Depression (F32.9)   []Other:   [x]Other:  fibromyalgia        Barriers to/and or personal factors that will affect rehab potential:              []Age  []Sex              []Motivation/Lack of Motivation                        []Co-Morbidities              []Cognitive Function, education/learning barriers              []Environmental, home barriers              [x]profession/work barriers- pt will continue to work needing to hold children throughout day   []past PT/medical experience  [x]other:fibromyalgia may contribute to more pain and higher irritability of symptoms  Justification:      Falls Risk Assessment (30 days):   [x] Falls Risk assessed and no intervention required. [] Falls Risk assessed and Patient requires intervention due to being higher risk   TUG score (>12s at risk):     [] Falls education provided, including         ASSESSMENT: Pt is a 62y.o. year old female with signs and symptoms consistent with B shoulder pain and RC tendonitis. Pt presents with decreased strength; decreased ROM; increased pain; decreased flexibility; poor posture; and decreased functional mobility and ADLs. Pt will benefit from skilled physical therapy services to address above limitations through strengthening, stretching, ROM exercises, modalities as need for pain control, manual as needed, posture education, instruction on HEP, and education for return to functional mobility.      Functional Impairments   []Noted spinal or UE joint hypomobility   []Noted spinal or UE joint hypermobility   [x]Decreased UE functional ROM   [x]Decreased UE functional strength   []Abnormal reflexes/sensation/myotomal/dermatomal deficits   [x]Decreased RC/scapular/core strength and neuromuscular control   []other:      Functional Activity Limitations (from functional questionnaire and intake)   []Reduced ability to tolerate prolonged functional positions   []Reduced ability or difficulty with changes of positions or transfers between positions   []Reduced ability to maintain good posture and demonstrate good body mechanics with sitting, bending, and lifting   [] Reduced ability or tolerance with driving and/or computer work   [x]Reduced ability to sleep   [x]Reduced ability to perform lifting, reaching, carrying tasks   [x]Reduced ability to tolerate impact through UE   [x]Reduced ability to reach behind back   []Reduced ability to  or hold objects   [x]Reduced ability to throw or toss an object   []other:    Participation Restrictions   [x]Reduced participation in self care activities   [x]Reduced participation in home management activities   [x]Reduced participation in work activities   []Reduced participation in social activities. []Reduced participation in sport/recreation activities. Classification:   []Signs/symptoms consistent with post-surgical status including decreased ROM, strength and function.   []Signs/symptoms consistent with joint sprain/strain   []Signs/symptoms consistent with shoulder impingement   [x]Signs/symptoms consistent with shoulder/elbow/wrist tendinopathy   []Signs/symptoms consistent with Rotator cuff tear   []Signs/symptoms consistent with labral tear   []Signs/symptoms consistent with postural dysfunction    []Signs/symptoms consistent with Glenohumeral IR Deficit - <45 degrees   []Signs/symptoms consistent with facet dysfunction of cervical/thoracic spine    []Signs/symptoms consistent with pathology which may benefit from Dry needling     []other:     Prognosis/Rehab Potential:      []Excellent   [x]Good    []Fair   []Poor    Tolerance of evaluation/treatment:    []Excellent   [x]Good    []Fair   []Poor    PLAN:  Frequency/Duration:  1-2 days per week for 4-6 Weeks:  INTERVENTIONS:  [x] Therapeutic exercise including: strength training, ROM, for Upper extremity and core   [x]  NMR activation and proprioception for UE, scap and Core   [x] Manual therapy as indicated for shoulder, scapula and spine to include: Dry Needling/IASTM, STM, PROM, Gr I-IV mobilizations, manipulation. [x] Modalities as needed that may include: thermal agents, E-stim, Biofeedback, US, iontophoresis as indicated  [x] Patient education on joint protection, postural re-education, activity modification, progression of HEP. HEP instruction: Pt was instructed in, and safely and correctly demonstrated home exercise program. Patient verbalized understanding of proper frequency of exercises. Copy of exercises was scanned into patient chart and can be fount in the media file. Access Code: [email or web address]  URL: [email or web address]  Date: 02/21/2022  Prepared by: Janice Ivan    Exercises  Doorway Pec Stretch at 90 Degrees Abduction - 2 x daily - 7 x weekly - 1 reps - 3 sets - 30 hold  Supine Scapular Protraction in Flexion with Dumbbells - 1 x daily - 7 x weekly - 10 reps - 3 sets  Sidelying Shoulder External Rotation - 1 x daily - 7 x weekly - 10 reps - 3 sets  Seated Scapular Retraction - 2 x daily - 7 x weekly - 10 reps - 1 sets - 10 hold  Scapular Retraction with Resistance - 1 x daily - 7 x weekly - 10 reps - 3 sets      GOALS:  Patient stated goal: normal arm movement    Therapist goals for Patient:   Short Term Goals: To be achieved in: 2 weeks  1. Independent in HEP and progression per patient tolerance, in order to prevent re-injury. [] Progressing: [] Met: [] Not Met: [] Adjusted   2. Patient will have a decrease in pain to facilitate improvement in movement, function, and ADLs as indicated by Functional Deficits. [] Progressing: [] Met: [] Not Met: [] Adjusted    Long Term Goals: To be achieved in: 4-6 weeks  1. Disability index score of 83% or more for the UEFI to assist with reaching prior level of function. [] Progressing: [] Met: [] Not Met: [] Adjusted  2.  Patient will demonstrate increased R shoulder AROM to 160 flex pain-free to allow for proper joint functioning as indicated by patients Functional Deficits. [] Progressing: [] Met: [] Not Met: [] Adjusted  3. Patient will demonstrate an increase in R shoulder strength to 4+/5 flex, abd, ER  in UE to allow for proper functional mobility as indicated by patients Functional Deficits. [] Progressing: [] Met: [] Not Met: [] Adjusted  4. Patient will return to ADLs over shoulder height including brushing hair without increased symptoms or restriction. [] Progressing: [] Met: [] Not Met: [] Adjusted  5. Pt will return to lifting and carrying kids at work without increased pain in shoulders B  [] Progressing: [] Met: [] Not Met: [] Adjusted          Physical Therapy Evaluation Complexity Justification  [x] A history of present problem with:  [] no personal factors and/or comorbidities that impact the plan of care;  [x]1-2 personal factors and/or comorbidities that impact the plan of care  []3 personal factors and/or comorbidities that impact the plan of care  [x] An examination of body systems using standardized tests and measures addressing any of the following: body structures and functions (impairments), activity limitations, and/or participation restrictions;:  [] a total of 1-2 or more elements   [] a total of 3 or more elements   [x] a total of 4 or more elements   [x] A clinical presentation with:  [x] stable and/or uncomplicated characteristics   [] evolving clinical presentation with changing characteristics  [] unstable and unpredictable characteristics;   [x] Clinical decision making of [x] low, [] moderate, [] high complexity using standardized patient assessment instrument and/or measurable assessment of functional outcome.     [x] EVAL (LOW) 99062 (typically 20 minutes face-to-face)  [] EVAL (MOD) 83331 (typically 30 minutes face-to-face)  [] EVAL (HIGH) 69437 (typically 45 minutes face-to-face)  [] RE-EVAL 66703    Electronically signed by:  Yves Martinez, PT, PT, DPT

## 2022-02-21 NOTE — FLOWSHEET NOTE
[address] [facility], [place]                                                         Physical Therapy Daily Treatment Note  Date:  2022    Patient Name:  Kalina Veronica    :  1960  MRN: [id number]    Medical/Treatment Diagnosis Information:  · Diagnosis: M75.41 (ICD-10-CM) - Impingement syndrome of right shoulder; M75.42 (ICD-10-CM) - Impingement syndrome of left shoulder;  · Treatment Diagnosis: M25.511 (R), M25.512 (L) shoulder pain; M62.81 muscle weakness  Insurance/Certification information:  PT Insurance Information: [address]- 30 visits, Auth req  Physician Information:  Referring Practitioner: Gauri Kwan MD  Has the plan of care been signed (Y/N):        []  Yes  [x]  No     Date of Patient follow up with Physician: 3/31/22      Is this a Progress Report:     []  Yes  [x]  No        If Yes:  Date Range for reporting period:  Beginning- 2022   Ending    Progress report will be due (10 Rx or 30 days whichever is less): 10 visits or        Recertification will be due (POC Duration  / 90 days whichever is less): as above         Visit # Insurance Allowable Auth Required   1 auth req [x]  Yes []  No        Functional Scale: UEFI -72.5%    Date assessed:  2022     Latex Allergy:  [x]NO      []YES  Preferred Language for Healthcare:   [x]English       []other:      Pain level:  3-6/10  on 2022    SUBJECTIVE:  See eval    OBJECTIVE: See eval   Observation:    Test measurements:      CERV ROM       Cervical Flexion WNL     Cervical Extension WNL     Cervical SB Mild tightness into R UT with R sidebend; none with L sidebend     Cervical rotation Mild tightness in R UT with L rotation                   ROM PROM AROM  Comment     L R L R     Flexion     160 156 mild pain     Abduction     180 180 difficult to return arm back to side     ER at side     80 77     ER at 90 abd     92 92     BB IR     T7 T9 mild tightness     IR at 90 abd     77 75     Other             Other                    Strength L R Comment   Flexion 4+ 4     Abduction 4+ 4- pain     ER 4 4- pain     IR 5 4+ mild pain     Supraspinatus         Upper Trap         Lower Trap         Mid Trap         Rhomboids         Biceps 5 5     Triceps 5 5     Horizontal Abduction 4+ 4-     Horizontal Adduction         Lats 4+ 4        Orthopedic Special Tests:   Special Tests Left Right   Apley Scratch IR:  ER:   Cross body: - IR:  ER:  Cross Body: -   Neer's       Full Can       Empty Can       Tara Brink - -   Nerve Tension Testing       Speed's - -   Valentine's        Spurling's       Repeated Scaption       Drop Arm (supraspinatus)       ER lag sign (infraspinatus)       Belly Press (subscapularis       Lift off (subscapularis)       Apprehension test       ER internal impingement test                                        Reflexes/Sensation (myotomes/dermatomes): n/t     Joint mobility: n/t              []?Normal                       []?Hypo              []?Hyper     Palpation: tender at R coracoid process     Functional Mobility/Transfers: Indep     Posture: mild forward rounded shoulders     Bandages/Dressings/Incisions: n/a     Gait: (include devices/WB status) Indep    RESTRICTIONS/PRECAUTIONS: Fibromyalgia; history of golfers elbow B    Exercises/Interventions:     Exercise/Equipment Resistance/Repetitions Other comments   Aerobic Conditioning     Aerodyne          Stretching/PROM     ER stretch supine     ER stretch seated     Table Slides     Wall slides      UE [place]     Pulleys     Pendulum     BB IR     SL IR     Pec doorway stretch 3 x 30 secs    CBA stretch     UT stretch     LS stretch     Isometrics     Retraction 2  x10 secs         Weight shift     Flexion     Abduction     External Rotation     Internal Rotation     Biceps     Triceps          PRE's     Scaption     Abduction     External Rotation- SL 3 x 10, 0, 1, 1 lb    Internal Rotation     Shrugs     EXT- prone     Reverse Flys- prone     Y's - prone     Prone ER at 90 abd     Serratus 3 x 10     Horizontal Abd with ER     Biceps     Triceps     Retraction     External rotation at 90 abd (hitch hiker)     Cable Column/Theraband     External Rotation     Internal Rotation     Shrugs     Lats     Ext     Flex     Scapular Retraction 3 x 10 green TB    BIC     TRIC     PNF          Dynamic Stability     Ball on wall     Push ups on wall     Push ups on ball on wall     90/90 ball taps     Body blade     Horizontal abd ball taps     Plyoback            Patient education: Pt was educated on PT diagnosis, prognosis, and plan of care. Pt was educated on the use of ice throughout the day. Therapeutic Exercise and NMR EXR  [x] (06402) Provided verbal/tactile cueing for activities related to strengthening, flexibility, endurance, ROM  for improvements in scapular, scapulothoracic and UE control with self care, reaching, carrying, lifting, house/yardwork, driving/computer work.    [] (89375) Provided verbal/tactile cueing for activities related to improving balance, coordination, kinesthetic sense, posture, motor skill, proprioception  to assist with  scapular, scapulothoracic and UE control with self care, reaching, carrying, lifting, house/yardwork, driving/computer work. Therapeutic Activities:    [x] (90717 or 51479) Provided verbal/tactile cueing for activities related to improving balance, coordination, kinesthetic sense, posture, motor skill, proprioception and motor activation to allow for proper function of scapular, scapulothoracic and UE control with self care, carrying, lifting, driving/computer work.      Home Exercise Program:    [x] (66637) Reviewed/Progressed HEP activities related to strengthening, flexibility, endurance, ROM of scapular, scapulothoracic and UE control with self care, reaching, carrying, lifting, house/yardwork, driving/computer work  [] (22825) Reviewed/Progressed HEP activities related to improving balance, coordination, kinesthetic sense, posture, motor skill, proprioception of scapular, scapulothoracic and UE control with self care, reaching, carrying, lifting, house/yardwork, driving/computer work      Manual Treatments:  PROM / STM / Oscillations-Mobs:  G-I, II, III, IV (PA's, Inf., Post.)  [] ([id number]) Provided manual therapy to mobilize soft tissue/joints of cervical/CT, scapular GHJ and UE for the purpose of modulating pain, promoting relaxation,  increasing ROM, reducing/eliminating soft tissue swelling/inflammation/restriction, improving soft tissue extensibility and allowing for proper ROM for normal function with self care, reaching, carrying, lifting, house/yardwork, driving/computer work    Modalities:      Charges:  Timed Code Treatment Minutes: 26   Total Treatment Minutes: 54     [x] EVAL (LOW) 51460   [] EVAL (MOD) 34570   [] EVAL (HIGH) 54866   [] RE-EVAL   [] KL(55758) x     [] IONTO  [] NMR (38494) x     [] VASO  [] Manual ([id number]) x      [] Other:  [] TA x      [] Mech Traction (36908)  [] ES(attended) (46853)      [] ES (un) (44529):       Janey Joseph stated goal: normal arm movement     Therapist goals for Patient:   Short Term Goals: To be achieved in: 2 weeks  1. Independent in HEP and progression per patient tolerance, in order to prevent re-injury. []? Progressing: []? Met: []? Not Met: []? Adjusted   2. Patient will have a decrease in pain to facilitate improvement in movement, function, and ADLs as indicated by Functional Deficits. []? Progressing: []? Met: []? Not Met: []? Adjusted     Long Term Goals: To be achieved in: 4-6 weeks  1. Disability index score of 83% or more for the UEFI to assist with reaching prior level of function. []? Progressing: []? Met: []? Not Met: []? Adjusted  2.  Patient will demonstrate increased R shoulder AROM to 160 flex pain-free to allow for proper joint functioning as indicated by patients Functional Deficits. []? Progressing: []? Met: []? Not Met: []? Adjusted  3. Patient will demonstrate an increase in R shoulder strength to 4+/5 flex, abd, ER  in UE to allow for proper functional mobility as indicated by patients Functional Deficits. []? Progressing: []? Met: []? Not Met: []? Adjusted  4. Patient will return to ADLs over shoulder height including brushing hair without increased symptoms or restriction. []? Progressing: []? Met: []? Not Met: []? Adjusted  5. Pt will return to lifting and carrying kids at work without increased pain in shoulders B  []? Progressing: []? Met: []? Not Met: []? Adjusted          Overall Progression Towards Functional goals/ Treatment Progress Update:  [] Patient is progressing as expected towards functional goals listed. [] Progression is slowed due to complexities/Impairments listed. [] Progression has been slowed due to co-morbidities.   [x] Plan just implemented, too soon to assess goals progression <30days   [] Goals require adjustment due to lack of progress  [] Patient is not progressing as expected and requires additional follow up with physician  [] Other    Prognosis for POC: [x] Good [] Fair  [] Poor      Patient requires continued skilled intervention: [x] Yes  [] No      ASSESSMENT:  See eval    Treatment/Activity Tolerance:  [x] Patient tolerated treatment well [] Patient limited by fatique  [] Patient limited by pain  [] Patient limited by other medical complications  [] Other:       Return to Play: (if applicable)   []  Stage 1: Intro to Strength   []  Stage 2: Return to Run and Strength   []  Stage 3: Return to Jump and Strength   []  Stage 4: Dynamic Strength and Agility   []  Stage 5: Sport Specific Training     []  Ready to Return to Play, Meets All Above Stages   []  Not Ready for Return to Sports   Comments:            Prognosis: [x] Good [] Fair  [] Poor    Patient Requires Follow-up: [x] Yes  [] No    PLAN: See eval; consider prone Ts, ER at 90 abd, ball on wall  [] Continue per plan of care [] Alter current plan (see comments above)  [x] Plan of care initiated [] Hold pending MD visit [] Discharge    Note: If patient does not return for scheduled/ recommended follow up visits, this note will serve as a discharge from care along with most recent update on progress.      Electronically signed by: Stacey Haque, PT PT, DPT

## 2022-02-28 ENCOUNTER — HOSPITAL ENCOUNTER (OUTPATIENT)
Dept: PHYSICAL THERAPY | Age: 62
Setting detail: THERAPIES SERIES
Discharge: HOME | End: 2022-02-28
Payer: MEDICAID

## 2022-02-28 NOTE — FLOWSHEET NOTE
[address] [facility]       Physical Therapy   Phone: [phone]   Fax: [phone]      Physical Therapy  Cancellation/No-show Note  Patient Name:  Hemanth Johnson  :  1960   Date:  2022  Cancelled visits to date: 0  No-shows to date: 1    For today's appointment patient:  []  Cancelled  []  Rescheduled appointment  [x]  No-show     Reason given by patient:  []  Patient ill  []  Conflicting appointment  []  No transportation    []  Conflict with work  []  No reason given  [x]  Other:     Comments: tried to call pt but went straight to voicemail     Electronically signed by:  Vi Kim, PT, PT

## 2022-03-03 ENCOUNTER — HOSPITAL ENCOUNTER (OUTPATIENT)
Dept: PHYSICAL THERAPY | Age: 62
Setting detail: THERAPIES SERIES
Discharge: HOME | End: 2022-03-03
Payer: MEDICAID

## 2022-03-03 PROCEDURE — 97112 NEUROMUSCULAR REEDUCATION: CPT | Performed by: PHYSICAL THERAPIST

## 2022-03-03 PROCEDURE — 97110 THERAPEUTIC EXERCISES: CPT | Performed by: PHYSICAL THERAPIST

## 2022-03-03 NOTE — FLOWSHEET NOTE
[address] [facility], [place]                                                         Physical Therapy Daily Treatment Note  Date:  3/3/2022    Patient Name:  Benito Richardson    :  1960  MRN: [id number]    Medical/Treatment Diagnosis Information:  · Diagnosis: M75.41 (ICD-10-CM) - Impingement syndrome of right shoulder; M75.42 (ICD-10-CM) - Impingement syndrome of left shoulder;  · Treatment Diagnosis: M25.511 (R), M25.512 (L) shoulder pain; M62.81 muscle weakness  Insurance/Certification information:  PT Insurance Information: [employer]- 30 visits, Auth req  Physician Information:  Referring Practitioner: Danica Chatterjee MD  Has the plan of care been signed (Y/N):        []  Yes  [x]  No     Date of Patient follow up with Physician: 3/31/22      Is this a Progress Report:     []  Yes  [x]  No        If Yes:  Date Range for reporting period:  Beginning- 2022   Ending    Progress report will be due (10 Rx or 30 days whichever is less): 10 visits or 71       Recertification will be due (POC Duration  / 90 days whichever is less): as above         Visit # Insurance Allowable Auth Required   1 plus eval auth 12 visits 22-4/15/22 [x]  Yes []  No        Functional Scale: UEFI -72.5%    Date assessed:  2022     Latex Allergy:  [x]NO      []YES  Preferred Language for Healthcare:   [x]English       []other:      Pain level:  4/10 R shoulder  on 3/3/2022    SUBJECTIVE:  Pt says exercises are going well on her own. She is not too sore after exercises. She has added more weights to a couple exercises.      OBJECTIVE: See eval   Observation:    Test measurements:      CERV ROM       Cervical Flexion WNL     Cervical Extension WNL     Cervical SB Mild tightness into R UT with R sidebend; none with L sidebend     Cervical rotation Mild tightness in R UT with L rotation                   ROM PROM AROM Comment     L R L R     Flexion     160 156 mild pain     Abduction     180 180 difficult to return arm back to side     ER at side     80 77     ER at 90 abd     92 92     BB IR     T7 T9 mild tightness     IR at 90 abd     77 75     Other             Other                    Strength L R Comment   Flexion 4+ 4     Abduction 4+ 4- pain     ER 4 4- pain     IR 5 4+ mild pain     Supraspinatus         Upper Trap         Lower Trap         Mid Trap         Rhomboids         Biceps 5 5     Triceps 5 5     Horizontal Abduction 4+ 4-     Horizontal Adduction         Lats 4+ 4        Orthopedic Special Tests:   Special Tests Left Right   Apley Scratch IR:  ER:   Cross body: - IR:  ER:  Cross Body: -   Neer's       Full Can       Empty Can       Enrike Slay - -   Nerve Tension Testing       Speed's - -   Valentine's        Spurling's       Repeated Scaption       Drop Arm (supraspinatus)       ER lag sign (infraspinatus)       Belly Press (subscapularis       Lift off (subscapularis)       Apprehension test       ER internal impingement test                                        Reflexes/Sensation (myotomes/dermatomes): n/t     Joint mobility: n/t              []?Normal                       []?Hypo              []?Hyper     Palpation: tender at R coracoid process     Functional Mobility/Transfers: Indep     Posture: mild forward rounded shoulders     Bandages/Dressings/Incisions: n/a     Gait: (include devices/WB status) Indep    RESTRICTIONS/PRECAUTIONS: Fibromyalgia; history of golfers elbow B    Exercises/Interventions:     Exercise/Equipment Resistance/Repetitions Other comments   Aerobic Conditioning     Aerodyne          Stretching/PROM     ER stretch supine     ER stretch seated     Table Slides     Wall slides      UE [place]     Pulleys     Pendulum     BB IR     SL IR     Pec doorway stretch 3 x 30 secs    CBA stretch     UT stretch     LS stretch     Isometrics     Retraction          Weight shift Flexion     Abduction     External Rotation     Internal Rotation     Biceps     Triceps          PRE's     Scaption     Abduction     External Rotation- SL 3 x 10, 2 lb B    Internal Rotation     Shrugs     EXT- prone     Reverse Flys- prone 1 x 10, + 1 x 5, hold 3 secs    Y's - prone     Prone ER at 90 abd     Serratus 3 x 10 4 lbs    Horizontal Abd with ER     Biceps     Triceps     Retraction     External rotation at 90 abd (hitch hiker) 3 x 10 3 lbs B    Cable Column/Theraband     External Rotation     Internal Rotation     Shrugs     Lats     Ext 3 x 10 red TB    Flex     Scapular Retraction 3 x 10 blue TB    BIC     TRIC     PNF          Dynamic Stability     Ball on wall 2x30 CW/CCW B    Push ups on wall     Push ups on ball on wall     90/90 ball taps     Body blade     Horizontal abd ball taps     Plyoback            Patient education: Pt was educated on PT diagnosis, prognosis, and plan of care. Pt was educated on the use of ice throughout the day. Therapeutic Exercise and NMR EXR  [x] (29069) Provided verbal/tactile cueing for activities related to strengthening, flexibility, endurance, ROM  for improvements in scapular, scapulothoracic and UE control with self care, reaching, carrying, lifting, house/yardwork, driving/computer work.    [] (85229) Provided verbal/tactile cueing for activities related to improving balance, coordination, kinesthetic sense, posture, motor skill, proprioception  to assist with  scapular, scapulothoracic and UE control with self care, reaching, carrying, lifting, house/yardwork, driving/computer work. Therapeutic Activities:    [x] (73192 or 98761) Provided verbal/tactile cueing for activities related to improving balance, coordination, kinesthetic sense, posture, motor skill, proprioception and motor activation to allow for proper function of scapular, scapulothoracic and UE control with self care, carrying, lifting, driving/computer work.      Home Exercise Program:    [x] (37684) Reviewed/Progressed HEP activities related to strengthening, flexibility, endurance, ROM of scapular, scapulothoracic and UE control with self care, reaching, carrying, lifting, house/yardwork, driving/computer work  [] (97252) Reviewed/Progressed HEP activities related to improving balance, coordination, kinesthetic sense, posture, motor skill, proprioception of scapular, scapulothoracic and UE control with self care, reaching, carrying, lifting, house/yardwork, driving/computer work    Access Code: [email or web address]  URL: [employer]/  Date: 03/03/2022  Prepared by: Patel Latch    Exercises  Doorway Pec Stretch at 90 Degrees Abduction - 2 x daily - 7 x weekly - 1 reps - 3 sets - 30 hold  Supine Scapular Protraction in Flexion with Dumbbells - 1 x daily - 7 x weekly - 10 reps - 3 sets  Sidelying Shoulder External Rotation - 1 x daily - 7 x weekly - 10 reps - 3 sets  Seated Shoulder External Rotation in Abduction Supported with Dumbbell - 1 x daily - 7 x weekly - 3 sets - 10 reps  Prone Shoulder Horizontal Abduction with External Rotation - 1 x daily - 7 x weekly - 2-3 sets - 10 reps - 3 hold  Scapular Retraction with Resistance - 1 x daily - 7 x weekly - 10 reps - 3 sets  Shoulder Extension with Resistance - 1 x daily - 7 x weekly - 3 sets - 10 reps  Standing Wall Ball Circles with Mini Swiss Ball - 1 x daily - 7 x weekly - 3 sets - 10 reps    Manual Treatments:  PROM / STM / Oscillations-Mobs:  G-I, II, III, IV (PA's, Inf., Post.)  [] (87005) Provided manual therapy to mobilize soft tissue/joints of cervical/CT, scapular GHJ and UE for the purpose of modulating pain, promoting relaxation,  increasing ROM, reducing/eliminating soft tissue swelling/inflammation/restriction, improving soft tissue extensibility and allowing for proper ROM for normal function with self care, reaching, carrying, lifting, house/yardwork, driving/computer work    Modalities:      Charges:  Timed Code Treatment Minutes: 40   Total Treatment Minutes: 43     [] EVAL (LOW) 93996   [] EVAL (MOD) 86115   [] EVAL (HIGH) 69145   [] RE-EVAL   [x] LI(38912) x  2   [] IONTO  [x] NMR (14260) x 1    [] VASO  [] Manual (54773) x      [] Other:  [] TA x      [] Mech Traction (12648)  [] ES(attended) (72322)      [] ES (un) (57555):       Cate Jane stated goal: normal arm movement     Therapist goals for Patient:   Short Term Goals: To be achieved in: 2 weeks  1. Independent in HEP and progression per patient tolerance, in order to prevent re-injury. []? Progressing: []? Met: []? Not Met: []? Adjusted   2. Patient will have a decrease in pain to facilitate improvement in movement, function, and ADLs as indicated by Functional Deficits. []? Progressing: []? Met: []? Not Met: []? Adjusted     Long Term Goals: To be achieved in: 4-6 weeks  1. Disability index score of 83% or more for the UEFI to assist with reaching prior level of function. []? Progressing: []? Met: []? Not Met: []? Adjusted  2. Patient will demonstrate increased R shoulder AROM to 160 flex pain-free to allow for proper joint functioning as indicated by patients Functional Deficits. []? Progressing: []? Met: []? Not Met: []? Adjusted  3. Patient will demonstrate an increase in R shoulder strength to 4+/5 flex, abd, ER  in UE to allow for proper functional mobility as indicated by patients Functional Deficits. []? Progressing: []? Met: []? Not Met: []? Adjusted  4. Patient will return to ADLs over shoulder height including brushing hair without increased symptoms or restriction. []? Progressing: []? Met: []? Not Met: []? Adjusted  5. Pt will return to lifting and carrying kids at work without increased pain in shoulders B  []? Progressing: []? Met: []? Not Met: []? Adjusted          Overall Progression Towards Functional goals/ Treatment Progress Update:  [] Patient is progressing as expected towards functional goals listed.     [] Progression is slowed due to complexities/Impairments listed. [] Progression has been slowed due to co-morbidities. [x] Plan just implemented, too soon to assess goals progression <30days   [] Goals require adjustment due to lack of progress  [] Patient is not progressing as expected and requires additional follow up with physician  [] Other    Prognosis for POC: [x] Good [] Fair  [] Poor      Patient requires continued skilled intervention: [x] Yes  [] No      ASSESSMENT:  See eval    Treatment/Activity Tolerance:  [x] Patient tolerated treatment well [] Patient limited by fatique  [] Patient limited by pain  [] Patient limited by other medical complications  [x] Other: pt was less sore with BB IR motion today so did not add in this stretch today. She was able to increase resistance on exercises reviewed from HEP today and able to maintain good form after some initial cuing. She was also progressed to a couple new exercises and cued for proper form and scapular position throughout. She was a little fatigued by end of session.  She was given updated HEP and discussed doing strengthening only every other day and did discuss doing HEP for elbow pain that she had PT for in the past.       Return to Play: (if applicable)   []  Stage 1: Intro to Strength   []  Stage 2: Return to Run and Strength   []  Stage 3: Return to Jump and Strength   []  Stage 4: Dynamic Strength and Agility   []  Stage 5: Sport Specific Training     []  Ready to Return to Play, Meets All Above Stages   []  Not Ready for Return to Sports   Comments:            Prognosis: [x] Good [] Fair  [] Poor    Patient Requires Follow-up: [x] Yes  [] No    PLAN: See eval; consider TB IR, scap, upper cut vs serratus slides on wall  [x] Continue per plan of care [] Alter current plan (see comments above)  [] Plan of care initiated [] Hold pending MD visit [] Discharge    Note: If patient does not return for scheduled/ recommended follow up visits, this note will serve as a discharge from care along with most recent update on progress.      Electronically signed by: Catherine Mota PT, DPT

## 2022-03-10 ENCOUNTER — HOSPITAL ENCOUNTER (OUTPATIENT)
Dept: PHYSICAL THERAPY | Age: 62
Setting detail: THERAPIES SERIES
Discharge: HOME | End: 2022-03-10
Payer: MEDICAID

## 2022-03-10 PROCEDURE — 97112 NEUROMUSCULAR REEDUCATION: CPT | Performed by: PHYSICAL THERAPIST

## 2022-03-10 PROCEDURE — 97110 THERAPEUTIC EXERCISES: CPT | Performed by: PHYSICAL THERAPIST

## 2022-03-10 NOTE — FLOWSHEET NOTE
[address] [facility], [place]                                                         Physical Therapy Daily Treatment Note  Date:  3/10/2022    Patient Name:  Kalina Dover    :  1960  MRN: [id number]    Medical/Treatment Diagnosis Information:  · Diagnosis: M75.41 (ICD-10-CM) - Impingement syndrome of right shoulder; M75.42 (ICD-10-CM) - Impingement syndrome of left shoulder;  · Treatment Diagnosis: M25.511 (R), M25.512 (L) shoulder pain; M62.81 muscle weakness  Insurance/Certification information:  PT Insurance Information: [address]- 30 visits, Auth req  Physician Information:  Referring Practitioner: Angela De Santiago MD  Has the plan of care been signed (Y/N):        [x]  Yes  []  No     Date of Patient follow up with Physician: 3/31/22      Is this a Progress Report:     []  Yes  [x]  No        If Yes:  Date Range for reporting period:  Beginning- 2022   Ending    Progress report will be due (10 Rx or 30 days whichever is less): 10 visits or        Recertification will be due (POC Duration  / 90 days whichever is less): as above         Visit # Insurance Allowable Auth Required   2 plus eval auth 12 visits 22-4/15/22 [x]  Yes []  No        Functional Scale: UEFI -72.5%    Date assessed:  2022     Latex Allergy:  [x]NO      []YES  Preferred Language for Healthcare:   [x]English       []other:      Pain level:  3/10 R shoulder  on 3/10/2022    SUBJECTIVE:   Pt says a few days ago she did more sets of several exercises because she was feeling good. However she had more pain and soreness the last couple days because of this. Pain up to 7/10. Pain was mostly anterior and some lateral shoulder. She did take Motrin a couple times since and is getting better. She felt fine after last session.      OBJECTIVE: See eval   Observation:    Test measurements:      CERV ROM       Cervical Flexion WNL     Cervical Extension WNL     Cervical SB Mild tightness into R UT with R sidebend; none with L sidebend     Cervical rotation Mild tightness in R UT with L rotation                   ROM PROM AROM  Comment     L R L R     Flexion     160 156 mild pain     Abduction     180 180 difficult to return arm back to side     ER at side     80 77     ER at 90 abd     92 92     BB IR     T7 T9 mild tightness     IR at 90 abd     77 75     Other             Other                    Strength L R Comment   Flexion 4+ 4     Abduction 4+ 4- pain     ER 4 4- pain     IR 5 4+ mild pain     Supraspinatus         Upper Trap         Lower Trap         Mid Trap         Rhomboids         Biceps 5 5     Triceps 5 5     Horizontal Abduction 4+ 4-     Horizontal Adduction         Lats 4+ 4        Orthopedic Special Tests:   Special Tests Left Right   Apley Scratch IR:  ER:   Cross body: - IR:  ER:  Cross Body: -   Neer's       Full Can       Empty Can       Jossy Myrna - -   Nerve Tension Testing       Speed's - -   Valentine's        Spurling's       Repeated Scaption       Drop Arm (supraspinatus)       ER lag sign (infraspinatus)       Belly Press (subscapularis       Lift off (subscapularis)       Apprehension test       ER internal impingement test                                        Reflexes/Sensation (myotomes/dermatomes): n/t     Joint mobility: n/t              []?Normal                       []?Hypo              []?Hyper     Palpation: tender at R coracoid process     Functional Mobility/Transfers: Indep     Posture: mild forward rounded shoulders     Bandages/Dressings/Incisions: n/a     Gait: (include devices/WB status) Indep    RESTRICTIONS/PRECAUTIONS: Fibromyalgia; history of golfers elbow B    Exercises/Interventions:     Exercise/Equipment Resistance/Repetitions Other comments   Aerobic Conditioning     Aerodyne          Stretching/PROM     ER stretch supine     ER stretch seated     Table Slides     Wall slides UE [place]     Pulleys     Pendulum     BB IR     SL IR     Pec doorway stretch 3 x 30 secs    CBA stretch     UT stretch     LS stretch     Isometrics     Retraction          Weight shift     Flexion     Abduction     External Rotation     Internal Rotation     Biceps     Triceps          PRE's     Scaption     Abduction     External Rotation- SL 3 x 10, 2 lb B    Internal Rotation     Shrugs     EXT- prone     Reverse Flys- prone     Y's - prone     Prone ER at 90 abd     Serratus 3 x 10 4 lbs    Horizontal Abd with ER     Biceps     Triceps     Retraction     External rotation at 90 abd (hitch hiker) 3 x 10, 7, 7  3 lbs B    Cable Column/Theraband     External Rotation ER upper cut 10  x5 secs hold, red TB    Internal Rotation 3 x 10 R black TB, attempted L side but clicking in shoulder so held    Shrugs     Lats     Ext 3 x 10 blue TB    Flex     Scapular Retraction 3 x 10 black TB    BIC     TRIC     PNF     Serratus slide on wall with band around wrists 1 x 10 , 1 x 8 red band    Dynamic Stability     Ball on wall 2x30 CW/CCW B    Push ups on wall     Push ups on ball on wall     90/90 ball taps     Body blade     Horizontal abd ball taps     Plyoback            Patient education: Pt was educated on PT diagnosis, prognosis, and plan of care. Pt was educated on the use of ice throughout the day. Therapeutic Exercise and NMR EXR  [x] (30556) Provided verbal/tactile cueing for activities related to strengthening, flexibility, endurance, ROM  for improvements in scapular, scapulothoracic and UE control with self care, reaching, carrying, lifting, house/yardwork, driving/computer work.    [] (86860) Provided verbal/tactile cueing for activities related to improving balance, coordination, kinesthetic sense, posture, motor skill, proprioception  to assist with  scapular, scapulothoracic and UE control with self care, reaching, carrying, lifting, house/yardwork, driving/computer work.     Therapeutic Activities:    [x] (95527 or 51234) Provided verbal/tactile cueing for activities related to improving balance, coordination, kinesthetic sense, posture, motor skill, proprioception and motor activation to allow for proper function of scapular, scapulothoracic and UE control with self care, carrying, lifting, driving/computer work. Home Exercise Program:    [x] (08164) Reviewed/Progressed HEP activities related to strengthening, flexibility, endurance, ROM of scapular, scapulothoracic and UE control with self care, reaching, carrying, lifting, house/yardwork, driving/computer work  [] (52237) Reviewed/Progressed HEP activities related to improving balance, coordination, kinesthetic sense, posture, motor skill, proprioception of scapular, scapulothoracic and UE control with self care, reaching, carrying, lifting, house/yardwork, driving/computer work    Access Code: [email or web address]  URL: [employer]/  Date: 03/03/2022  Prepared by: Savanna Congress    Exercises  Doorway Pec Stretch at 90 Degrees Abduction - 2 x daily - 7 x weekly - 1 reps - 3 sets - 30 hold  Supine Scapular Protraction in Flexion with Dumbbells - 1 x daily - 7 x weekly - 10 reps - 3 sets  Sidelying Shoulder External Rotation - 1 x daily - 7 x weekly - 10 reps - 3 sets  Seated Shoulder External Rotation in Abduction Supported with Dumbbell - 1 x daily - 7 x weekly - 3 sets - 10 reps  Prone Shoulder Horizontal Abduction with External Rotation - 1 x daily - 7 x weekly - 2-3 sets - 10 reps - 3 hold  Scapular Retraction with Resistance - 1 x daily - 7 x weekly - 10 reps - 3 sets  Shoulder Extension with Resistance - 1 x daily - 7 x weekly - 3 sets - 10 reps  Standing Wall Ball Circles with Mini Swiss Ball - 1 x daily - 7 x weekly - 3 sets - 10 reps    Manual Treatments:  PROM / STM / Oscillations-Mobs:  G-I, II, III, IV (PA's, Inf., Post.)  [] (70891) Provided manual therapy to mobilize soft tissue/joints of cervical/CT, scapular GHJ and UE for the purpose of modulating pain, promoting relaxation,  increasing ROM, reducing/eliminating soft tissue swelling/inflammation/restriction, improving soft tissue extensibility and allowing for proper ROM for normal function with self care, reaching, carrying, lifting, house/yardwork, driving/computer work    Modalities:      Charges:  Timed Code Treatment Minutes: 42   Total Treatment Minutes: 43     [] EVAL (LOW) 96760   [] EVAL (MOD) 20628   [] EVAL (HIGH) 16530   [] RE-EVAL   [x] TR(87552) x  2   [] IONTO  [x] NMR (15748) x 1    [] VASO  [] Manual (52147) x      [] Other:  [] TA x      [] Mech Traction (94278)  [] ES(attended) (59950)      [] ES (un) (14635):       Frank Wilson stated goal: normal arm movement     Therapist goals for Patient:   Short Term Goals: To be achieved in: 2 weeks  1. Independent in HEP and progression per patient tolerance, in order to prevent re-injury. []? Progressing: []? Met: []? Not Met: []? Adjusted   2. Patient will have a decrease in pain to facilitate improvement in movement, function, and ADLs as indicated by Functional Deficits. []? Progressing: []? Met: []? Not Met: []? Adjusted     Long Term Goals: To be achieved in: 4-6 weeks  1. Disability index score of 83% or more for the UEFI to assist with reaching prior level of function. []? Progressing: []? Met: []? Not Met: []? Adjusted  2. Patient will demonstrate increased R shoulder AROM to 160 flex pain-free to allow for proper joint functioning as indicated by patients Functional Deficits. []? Progressing: []? Met: []? Not Met: []? Adjusted  3. Patient will demonstrate an increase in R shoulder strength to 4+/5 flex, abd, ER  in UE to allow for proper functional mobility as indicated by patients Functional Deficits. []? Progressing: []? Met: []? Not Met: []? Adjusted  4. Patient will return to ADLs over shoulder height including brushing hair without increased symptoms or restriction. []? Progressing: []?  Met: []? Not Met: []? Adjusted  5. Pt will return to lifting and carrying kids at work without increased pain in shoulders B  []? Progressing: []? Met: []? Not Met: []? Adjusted          Overall Progression Towards Functional goals/ Treatment Progress Update:  [] Patient is progressing as expected towards functional goals listed. [] Progression is slowed due to complexities/Impairments listed. [] Progression has been slowed due to co-morbidities. [x] Plan just implemented, too soon to assess goals progression <30days   [] Goals require adjustment due to lack of progress  [] Patient is not progressing as expected and requires additional follow up with physician  [] Other    Prognosis for POC: [x] Good [] Fair  [] Poor      Patient requires continued skilled intervention: [x] Yes  [] No      ASSESSMENT:  See eval    Treatment/Activity Tolerance:  [x] Patient tolerated treatment well [] Patient limited by fatique  [] Patient limited by pain  [] Patient limited by other medical complications  [x] Other: pt was able to perform all exercises today and add in a couple new ones. She felt appropriate fatigue in shoulders with exercises and by end of session. She was cued for correct scapular position during exercises.   She was given updated HEP and discussed doing strengthening only every other day and did discuss doing HEP for elbow pain that she had PT for in the past.       Return to Play: (if applicable)   []  Stage 1: Intro to Strength   []  Stage 2: Return to Run and Strength   []  Stage 3: Return to Jump and Strength   []  Stage 4: Dynamic Strength and Agility   []  Stage 5: Sport Specific Training     []  Ready to Return to Play, Meets All Above Stages   []  Not Ready for Return to Sports   Comments:            Prognosis: [x] Good [] Fair  [] Poor    Patient Requires Follow-up: [x] Yes  [] No    PLAN: See eval; consider scap, updated HEP  [x] Continue per plan of care [] Alter current plan (see comments above)  [] Plan of care initiated [] Hold pending MD visit [] Discharge    Note: If patient does not return for scheduled/ recommended follow up visits, this note will serve as a discharge from care along with most recent update on progress.      Electronically signed by: Chana Altman PT, DPT

## 2022-03-17 ENCOUNTER — HOSPITAL ENCOUNTER (OUTPATIENT)
Dept: PHYSICAL THERAPY | Age: 62
Setting detail: THERAPIES SERIES
Discharge: HOME | End: 2022-03-17
Payer: MEDICAID

## 2022-03-17 PROCEDURE — 97110 THERAPEUTIC EXERCISES: CPT | Performed by: PHYSICAL THERAPIST

## 2022-03-17 PROCEDURE — 97112 NEUROMUSCULAR REEDUCATION: CPT | Performed by: PHYSICAL THERAPIST

## 2022-03-17 NOTE — FLOWSHEET NOTE
[facility], [place]                                                         Physical Therapy Daily Treatment Note  Date:  3/17/2022    Patient Name:  Raj Dennis    :  1960  MRN: [id number]    Medical/Treatment Diagnosis Information:  · Diagnosis: M75.41 (ICD-10-CM) - Impingement syndrome of right shoulder; M75.42 (ICD-10-CM) - Impingement syndrome of left shoulder;  · Treatment Diagnosis: M25.511 (R), M25.512 (L) shoulder pain; M62.81 muscle weakness  Insurance/Certification information:  PT Insurance Information: [employer]- 30 visits, Auth req  Physician Information:  Referring Practitioner: Criselda Bates MD  Has the plan of care been signed (Y/N):        [x]  Yes  []  No     Date of Patient follow up with Physician: 3/31/22      Is this a Progress Report:     []  Yes  [x]  No        If Yes:  Date Range for reporting period:  Beginning- 2022   Ending    Progress report will be due (10 Rx or 30 days whichever is less): 10 visits or 80       Recertification will be due (POC Duration  / 90 days whichever is less): as above         Visit # Insurance Allowable Auth Required   3 plus eval auth 12 visits 22-4/15/22 [x]  Yes []  No        Functional Scale: UEFI -72.5%    Date assessed:  2022     Latex Allergy:  [x]NO      []YES  Preferred Language for Healthcare:   [x]English       []other:      Pain level:  3/10 R shoulder on Motrin  on 3/17/2022    SUBJECTIVE:    Pt reports she has been having to take Motrin. She tried last night to lift arm up over head and could not get elbow past 90 flex. Today she is able to because she took Motrin. She did exercises yesterday without weight because she was more sore and coming to PT today.      OBJECTIVE: See eval   Observation:    Test measurements:      CERV ROM       Cervical Flexion WNL     Cervical Extension WNL     Cervical SB Mild tightness into R UT with R sidebend; none with L sidebend     Cervical rotation Mild tightness in R UT with L rotation                   ROM PROM AROM  Comment     L R L R     Flexion     160 156 mild pain     Abduction     180 180 difficult to return arm back to side     ER at side     80 77     ER at 90 abd     92 92     BB IR     T7 T9 mild tightness     IR at 90 abd     77 75     Other             Other                    Strength L R Comment   Flexion 4+ 4     Abduction 4+ 4- pain     ER 4 4- pain     IR 5 4+ mild pain     Supraspinatus         Upper Trap         Lower Trap         Mid Trap         Rhomboids         Biceps 5 5     Triceps 5 5     Horizontal Abduction 4+ 4-     Horizontal Adduction         Lats 4+ 4        Orthopedic Special Tests:   Special Tests Left Right   Apley Scratch IR:  ER:   Cross body: - IR:  ER:  Cross Body: -   Neer's       Full Can       Empty Can       Abel Pro - -   Nerve Tension Testing       Speed's - -   Valentine's        Spurling's       Repeated Scaption       Drop Arm (supraspinatus)       ER lag sign (infraspinatus)       Belly Press (subscapularis       Lift off (subscapularis)       Apprehension test       ER internal impingement test                                        Reflexes/Sensation (myotomes/dermatomes): n/t     Joint mobility: n/t              []?Normal                       []?Hypo              []?Hyper     Palpation: tender at R coracoid process     Functional Mobility/Transfers: Indep     Posture: mild forward rounded shoulders     Bandages/Dressings/Incisions: n/a     Gait: (include devices/WB status) Indep    RESTRICTIONS/PRECAUTIONS: Fibromyalgia; history of golfers elbow B    Exercises/Interventions:     Exercise/Equipment Resistance/Repetitions Other comments   Aerobic Conditioning     Aerodyne          Stretching/PROM     ER stretch supine     ER stretch seated     Table Slides     Wall slides      UE [place]     Pulleys     Pendulum     BB IR SL IR     Pec doorway stretch 3 x 30 secs    CBA stretch     UT stretch     LS stretch     Isometrics     Retraction          Weight shift     Flexion     Abduction     External Rotation     Internal Rotation     Biceps     Triceps          PRE's     Scaption X 10 0 lbs Painful R shoulder so held to one set   Abduction     External Rotation- SL 3 x 10, 2 lb B    Internal Rotation     Shrugs     EXT- prone     Reverse Flys- prone 2 x 10, hold 3 secs    Y's - prone     Prone ER at 90 abd     Serratus 3 x 10 5 lbs    Horizontal Abd with ER     Biceps     Triceps     Retraction     External rotation at 90 abd (hitch hiker) 3 x 10 3 lbs L;   3 x 10 2, 2, 3 lbs R    Cable Column/Theraband     External Rotation ER upper cut 10  x5 secs hold, red TB    Internal Rotation     Shrugs     Lats     Ext 3 x 10 blue TB    Flex     Scapular Retraction 3 x 10 black TB    BIC     TRIC     PNF     Serratus slide on wall with band around wrists 1 x 10 , 1 x 6 red band    Dynamic Stability     Ball on wall 2x30 CW/CCW B    Push ups on wall     Push ups on ball on wall     90/90 ball taps     Body blade     Horizontal abd ball taps     Plyoback            Patient education: Pt was educated on PT diagnosis, prognosis, and plan of care. Pt was educated on the use of ice throughout the day. Therapeutic Exercise and NMR EXR  [x] (47766) Provided verbal/tactile cueing for activities related to strengthening, flexibility, endurance, ROM  for improvements in scapular, scapulothoracic and UE control with self care, reaching, carrying, lifting, house/yardwork, driving/computer work.    [] (64628) Provided verbal/tactile cueing for activities related to improving balance, coordination, kinesthetic sense, posture, motor skill, proprioception  to assist with  scapular, scapulothoracic and UE control with self care, reaching, carrying, lifting, house/yardwork, driving/computer work.     Therapeutic Activities:    [x] (48777 or 95323) Provided verbal/tactile cueing for activities related to improving balance, coordination, kinesthetic sense, posture, motor skill, proprioception and motor activation to allow for proper function of scapular, scapulothoracic and UE control with self care, carrying, lifting, driving/computer work. Home Exercise Program:    [x] (35816) Reviewed/Progressed HEP activities related to strengthening, flexibility, endurance, ROM of scapular, scapulothoracic and UE control with self care, reaching, carrying, lifting, house/yardwork, driving/computer work  [] (29871) Reviewed/Progressed HEP activities related to improving balance, coordination, kinesthetic sense, posture, motor skill, proprioception of scapular, scapulothoracic and UE control with self care, reaching, carrying, lifting, house/yardwork, driving/computer work    Access Code: [email or web address]  URL: [employer]/  Date: 03/17/2022  Prepared by: Dionisio Love    Exercises  Doorway Pec Stretch at 90 Degrees Abduction - 2 x daily - 7 x weekly - 1 reps - 3 sets - 30 hold  Supine Scapular Protraction in Flexion with Dumbbells - 1 x daily - 7 x weekly - 10 reps - 3 sets  Sidelying Shoulder External Rotation - 1 x daily - 7 x weekly - 10 reps - 3 sets  Seated Shoulder External Rotation in Abduction Supported with Dumbbell - 1 x daily - 7 x weekly - 3 sets - 10 reps  Prone Shoulder Horizontal Abduction with External Rotation - 1 x daily - 7 x weekly - 2-3 sets - 10 reps - 3 hold  Scapular Retraction with Resistance - 1 x daily - 7 x weekly - 10 reps - 3 sets  Shoulder Extension with Resistance - 1 x daily - 7 x weekly - 3 sets - 10 reps  Standing Wall Ball Circles with Mini Swiss Ball - 1 x daily - 7 x weekly - 3 sets - 10 reps  Shoulder External Rotation Reactive Isometrics - 1 x daily - 7 x weekly - 1 sets - 10 reps - 5 hold  Serratus Activation at Wall with The Michael-Balta and Resistance Band - 1 x daily - 7 x weekly - 3 sets - 10 reps      Manual Treatments: PROM / STM / Oscillations-Mobs:  G-I, II, III, IV (PA's, Inf., Post.)  [] (50790) Provided manual therapy to mobilize soft tissue/joints of cervical/CT, scapular GHJ and UE for the purpose of modulating pain, promoting relaxation,  increasing ROM, reducing/eliminating soft tissue swelling/inflammation/restriction, improving soft tissue extensibility and allowing for proper ROM for normal function with self care, reaching, carrying, lifting, house/yardwork, driving/computer work    Modalities:      Charges:  Timed Code Treatment Minutes: 42   Total Treatment Minutes: 45     [] EVAL (LOW) 11829   [] EVAL (MOD) 83981   [] EVAL (HIGH) 91845   [] RE-EVAL   [x] AR(07401) x  2   [] IONTO  [x] NMR (77886) x 1    [] VASO  [] Manual (44729) x      [] Other:  [] TA x      [] Mech Traction (22517)  [] ES(attended) (94751)      [] ES (un) (02256):       Britney Jiang stated goal: normal arm movement     Therapist goals for Patient:   Short Term Goals: To be achieved in: 2 weeks  1. Independent in HEP and progression per patient tolerance, in order to prevent re-injury. []? Progressing: []? Met: []? Not Met: []? Adjusted   2. Patient will have a decrease in pain to facilitate improvement in movement, function, and ADLs as indicated by Functional Deficits. []? Progressing: []? Met: []? Not Met: []? Adjusted     Long Term Goals: To be achieved in: 4-6 weeks  1. Disability index score of 83% or more for the UEFI to assist with reaching prior level of function. []? Progressing: []? Met: []? Not Met: []? Adjusted  2. Patient will demonstrate increased R shoulder AROM to 160 flex pain-free to allow for proper joint functioning as indicated by patients Functional Deficits. []? Progressing: []? Met: []? Not Met: []? Adjusted  3. Patient will demonstrate an increase in R shoulder strength to 4+/5 flex, abd, ER  in UE to allow for proper functional mobility as indicated by patients Functional Deficits. []?  Progressing: []? Met: []? Not Met: []? Adjusted  4. Patient will return to ADLs over shoulder height including brushing hair without increased symptoms or restriction. []? Progressing: []? Met: []? Not Met: []? Adjusted  5. Pt will return to lifting and carrying kids at work without increased pain in shoulders B  []? Progressing: []? Met: []? Not Met: []? Adjusted          Overall Progression Towards Functional goals/ Treatment Progress Update:  [] Patient is progressing as expected towards functional goals listed. [] Progression is slowed due to complexities/Impairments listed. [] Progression has been slowed due to co-morbidities. [x] Plan just implemented, too soon to assess goals progression <30days   [] Goals require adjustment due to lack of progress  [] Patient is not progressing as expected and requires additional follow up with physician  [] Other    Prognosis for POC: [x] Good [] Fair  [] Poor      Patient requires continued skilled intervention: [x] Yes  [] No      ASSESSMENT:  See eval    Treatment/Activity Tolerance:  [x] Patient tolerated treatment well [] Patient limited by fatique  [] Patient limited by pain  [] Patient limited by other medical complications  [x] Other: attempted standing scaption today but R shoulder became more painful as she went so held to one set. She did have to decrease resistance on ER in abd on R side today. She also struggled more with ER upper cut iso on R side today with quick fatigue. Pt was educated on importance of continuing to add resistance to exercises as tolerated to overload muscles enough to improve strength. Pt will continue to be progressed in shoulder strengthening as tolerated.        Return to Play: (if applicable)   []  Stage 1: Intro to Strength   []  Stage 2: Return to Run and Strength   []  Stage 3: Return to Jump and Strength   []  Stage 4: Dynamic Strength and Agility   []  Stage 5: Sport Specific Training     []  Ready to Return to Play, Meets All Above Stages   []  Not Ready for Return to Sports   Comments:            Prognosis: [x] Good [] Fair  [] Poor    Patient Requires Follow-up: [x] Yes  [] No    PLAN: See eval; Progress note. [x] Continue per plan of care [] Alter current plan (see comments above)  [] Plan of care initiated [] Hold pending MD visit [] Discharge    Note: If patient does not return for scheduled/ recommended follow up visits, this note will serve as a discharge from care along with most recent update on progress.      Electronically signed by: Melvin Wilkins, PT, DPT

## 2022-03-24 DIAGNOSIS — K21.00 GASTROESOPHAGEAL REFLUX DISEASE WITH ESOPHAGITIS, UNSPECIFIED WHETHER HEMORRHAGE: Chronic | ICD-10-CM

## 2022-03-24 RX ORDER — PANTOPRAZOLE SODIUM 40 MG/1
40 TABLET, DELAYED RELEASE ORAL
Qty: 30 TABLET | Refills: 3 | Status: SHIPPED | OUTPATIENT
Start: 2022-03-24 | End: 2022-07-08

## 2022-03-24 NOTE — TELEPHONE ENCOUNTER
Last seen: 9/13/21        Future Appointments   Date Time Provider Jonah Bhakta   3/28/2022  2:45  [facility] Rangel, MICHELLE Hassan \[facility] [place] [facility]\""   3/31/2022  9:45 AM MD Julieth Nino [address] [facility]   4/15/2022  8:30 AM William Manzanares MD [facility]

## 2022-03-28 ENCOUNTER — HOSPITAL ENCOUNTER (OUTPATIENT)
Dept: PHYSICAL THERAPY | Age: 62
Setting detail: THERAPIES SERIES
Discharge: HOME | End: 2022-03-28
Payer: MEDICAID

## 2022-03-28 PROCEDURE — 97110 THERAPEUTIC EXERCISES: CPT | Performed by: PHYSICAL THERAPIST

## 2022-03-28 PROCEDURE — 97112 NEUROMUSCULAR REEDUCATION: CPT | Performed by: PHYSICAL THERAPIST

## 2022-03-28 NOTE — PLAN OF CARE
[address] [facility],[address], [place]                                                     Physical Therapy Re-Certification Plan of Bessie Arita      Dear  Dr. Jaimie Duran,    We had the pleasure of treating the following patient for physical therapy services at [address] [place] [address]. A summary of our findings can be found in the updated assessment below. This includes our plan of care. If you have any questions or concerns regarding these findings, please do not hesitate to contact me at the office phone number checked above. Thank you for the referral.     Physician Signature:________________________________Date:__________________  By signing above (or electronic signature), therapists plan is approved by physician    Total Visits to Date: 4 plus eval  Overall Response to Treatment:   [x]Patient is responding well to treatment and improvement is noted with regards  to goals   []Patient should continue to improve in reasonable time if they continue HEP   []Patient has plateaued and is no longer responding to skilled PT intervention    []Patient is getting worse and would benefit from return to referring MD   []Patient unable to adhere to initial POC   [x]Other: Pt has shown improved ROM and strength since starting PT. However she still has pain with active elevation overhead, some weakness and discomfort resisting RC, and reports continued pain in shoulder each day. She was educated on trying to avoid UT compensation/shoulder hike with reaching overhead and during exercises to avoid continued tightness in UT/LS muscles. She was able to increase resistance on several exercises in session today also showing improving strength. Pt will follow up with MD this week to discuss plan.  If plan is to continue with PT, recommend continued ROM and strengthening of RC and scapular muscles to improve pts ability to reach overhead and decrease pain with ADLs. Physical Therapy Daily Treatment Note  Date:  3/28/2022    Patient Name:  Desirae Farr    :  1960  MRN: [id number]    Medical/Treatment Diagnosis Information:  · Diagnosis: M75.41 (ICD-10-CM) - Impingement syndrome of right shoulder; M75.42 (ICD-10-CM) - Impingement syndrome of left shoulder;  · Treatment Diagnosis: M25.511 (R), M25.512 (L) shoulder pain; M62.81 muscle weakness  Insurance/Certification information:  PT Insurance Information: [employer]- 30 visits, Auth req  Physician Information:  Referring Practitioner: Segundo Ronquillo MD  Has the plan of care been signed (Y/N):        [x]  Yes  []  No     Date of Patient follow up with Physician: 3/31/22      Is this a Progress Report:     [x]  Yes  []  No        If Yes:  Date Range for reporting period:  Beginning- 2022   Ending -3/28/22    Progress report will be due (10 Rx or 30 days whichever is less):        Recertification will be due (POC Duration  / 90 days whichever is less): as above         Visit # Insurance Allowable Auth Required   4 plus eval auth 12 visits 22-4/15/22 [x]  Yes []  No        Functional Scale: UEFI -68.75%    Date assessed:  3/28/2022     Latex Allergy:  [x]NO      []YES  Preferred Language for Healthcare:   [x]English       []other:      Pain level:  5/10 R shoulder before Motrin today. on 3/28/2022    SUBJECTIVE:    Pt says shoulder is sore. After she does serratus slides on wall she is more in her R UT and LS. She did have to take Motrin today because her neck was more tight. Her shoulder has also been sore.  She does not notice much improvement since starting PT.     OBJECTIVE:    Observation:    Test measurements:  3/28/22    CERV ROM       Cervical Flexion     Cervical Extension     Cervical SB     Cervical rotation                   ROM PROM AROM-3/28/22  Comment     L R L R-      Flexion     160 160 mild pain     Abduction     180 180      ER at side     80 82     ER at 90 abd     92 98     BB IR     T7 T8 mild tightness     IR at 90 abd     77 75     Other             Other                    Strength-3/28/22 L R- Comment   Flexion 4+ 4 mild pain     Abduction 4+ 4+      ER 4 4- mild pain     IR 5 5      Supraspinatus         Upper Trap         Lower Trap         Mid Trap         Rhomboids         Biceps 5 5     Triceps 5 5     Horizontal Abduction 4+ 4     Horizontal Adduction         Lats 4+ 4+        Orthopedic Special Tests:   Special Tests Left Right   Apley Scratch IR:  ER:   Cross body: - IR:  ER:  Cross Body: -   Neer's       Full Can       Empty Can       Yuki Clack - -   Nerve Tension Testing       Speed's - -   Valentine's        Spurling's       Repeated Scaption       Drop Arm (supraspinatus)       ER lag sign (infraspinatus)       Belly Press (subscapularis       Lift off (subscapularis)       Apprehension test       ER internal impingement test                                        Reflexes/Sensation (myotomes/dermatomes): n/t     Joint mobility: n/t              []?Normal                       []?Hypo              []?Hyper     Palpation: tender at R coracoid process     Functional Mobility/Transfers: Indep     Posture: mild forward rounded shoulders     Bandages/Dressings/Incisions: n/a     Gait: (include devices/WB status) Indep    RESTRICTIONS/PRECAUTIONS: Fibromyalgia; history of golfers elbow B    Exercises/Interventions:     Exercise/Equipment Resistance/Repetitions Other comments   Aerobic Conditioning     Aerodyne          Stretching/PROM     ER stretch supine     ER stretch seated     Table Slides     Wall slides      UE [place]     Pulleys     Pendulum     BB IR     SL IR     Pec doorway stretch 3 x 30 secs    CBA stretch     UT stretch     LS stretch     Isometrics     Retraction          Weight shift     Flexion     Abduction     External Rotation     Internal Rotation     Biceps     Triceps          PRE's     Scaption 2 X 10 0 lbs, 1 x 10 1 lbs Painful R shoulder so held to one set   Abduction     External Rotation- SL     Internal Rotation     Shrugs     EXT- prone     Reverse Flys- prone 2 x 10, hold 3 secs    Y's - prone     Prone ER at 90 abd     Serratus 3 x 10 5 lbs    Horizontal Abd with ER     Biceps     Triceps     Retraction     External rotation at 90 abd (hitch hiker) 3 x 10 3 lbs L;   3 x 10 3 lbs R    Cable Column/Theraband     External Rotation ER upper cut 10  x5 secs hold, red TB  3 x 10 red TB    Internal Rotation     Shrugs     Lats     Ext     Flex     Scapular Retraction 3 x 10 black TB    BIC     TRIC     PNF     Serratus slide on wall with band around wrists     Dynamic Stability     Ball on wall 3x30 CW/CCW B    Push ups on wall     Push ups on ball on wall     90/90 ball taps     Body blade     Horizontal abd ball taps     Plyoback            Patient education: Pt was educated on PT diagnosis, prognosis, and plan of care. Pt was educated on the use of ice throughout the day. Therapeutic Exercise and NMR EXR  [x] (26516) Provided verbal/tactile cueing for activities related to strengthening, flexibility, endurance, ROM  for improvements in scapular, scapulothoracic and UE control with self care, reaching, carrying, lifting, house/yardwork, driving/computer work.    [] (15929) Provided verbal/tactile cueing for activities related to improving balance, coordination, kinesthetic sense, posture, motor skill, proprioception  to assist with  scapular, scapulothoracic and UE control with self care, reaching, carrying, lifting, house/yardwork, driving/computer work. Therapeutic Activities:    [x] (81855 or 07667) Provided verbal/tactile cueing for activities related to improving balance, coordination, kinesthetic sense, posture, motor skill, proprioception and motor activation to allow for proper function of scapular, scapulothoracic and UE control with self care, carrying, lifting, driving/computer work.      Home Exercise Program:    [x] (84001) Reviewed/Progressed HEP activities related to strengthening, flexibility, endurance, ROM of scapular, scapulothoracic and UE control with self care, reaching, carrying, lifting, house/yardwork, driving/computer work  [] (34236) Reviewed/Progressed HEP activities related to improving balance, coordination, kinesthetic sense, posture, motor skill, proprioception of scapular, scapulothoracic and UE control with self care, reaching, carrying, lifting, house/yardwork, driving/computer work    Access Code: [email or web address]  URL: [email or web address]  Date: 03/17/2022  Prepared by: Marti Vieyra    Exercises  Doorway Pec Stretch at 90 Degrees Abduction - 2 x daily - 7 x weekly - 1 reps - 3 sets - 30 hold  Supine Scapular Protraction in Flexion with Dumbbells - 1 x daily - 7 x weekly - 10 reps - 3 sets  Sidelying Shoulder External Rotation - 1 x daily - 7 x weekly - 10 reps - 3 sets  Seated Shoulder External Rotation in Abduction Supported with Dumbbell - 1 x daily - 7 x weekly - 3 sets - 10 reps  Prone Shoulder Horizontal Abduction with External Rotation - 1 x daily - 7 x weekly - 2-3 sets - 10 reps - 3 hold  Scapular Retraction with Resistance - 1 x daily - 7 x weekly - 10 reps - 3 sets  Shoulder Extension with Resistance - 1 x daily - 7 x weekly - 3 sets - 10 reps  Standing Wall Ball Circles with Mini Swiss Ball - 1 x daily - 7 x weekly - 3 sets - 10 reps  Shoulder External Rotation Reactive Isometrics - 1 x daily - 7 x weekly - 1 sets - 10 reps - 5 hold  Serratus Activation at Wall with The Michael-Balta and Resistance Band - 1 x daily - 7 x weekly - 3 sets - 10 reps  Access Code: [email or web address]  URL: [email or web address]  Date: 03/28/2022  Prepared by: Marti Vieyra    Exercises  Shoulder External Rotation with Anchored Resistance - 1 x daily - 7 x weekly - 3 sets - 10 reps  Standing Shoulder Scaption - 1 x daily - 7 x weekly - 3 sets - 10 reps      Manual Treatments:  PROM / STM / Oscillations-Mobs:  G-I, II, III, IV (PA's, Inf., Post.)  [] (54889) Provided manual therapy to mobilize soft tissue/joints of cervical/CT, scapular GHJ and UE for the purpose of modulating pain, promoting relaxation,  increasing ROM, reducing/eliminating soft tissue swelling/inflammation/restriction, improving soft tissue extensibility and allowing for proper ROM for normal function with self care, reaching, carrying, lifting, house/yardwork, driving/computer work    Modalities:      Charges:  Timed Code Treatment Minutes: 40   Total Treatment Minutes: 50     [] EVAL (LOW) 55406   [] EVAL (MOD) 81365   [] EVAL (HIGH) 16813   [] RE-EVAL   [x] RT(50942) x  2   [] IONTO  [x] NMR (32402) x 1    [] VASO  [] Manual (51830) x      [] Other:  [] TA x      [] Mech Traction (23123)  [] ES(attended) (36977)      [] ES (un) (86922):       Michelle Bradshaw stated goal: normal arm movement     Therapist goals for Patient:   Short Term Goals: To be achieved in: 2 weeks  1. Independent in HEP and progression per patient tolerance, in order to prevent re-injury. []? Progressing: [x]? Met: []? Not Met: []? Adjusted   2. Patient will have a decrease in pain to facilitate improvement in movement, function, and ADLs as indicated by Functional Deficits. []? Progressing: [x]? Met: []? Not Met: []? Adjusted     Long Term Goals: To be achieved in: 4-6 weeks  1. Disability index score of 83% or more for the UEFI to assist with reaching prior level of function. []? Progressing: []? Met: [x]? Not Met: []? Adjusted  2. Patient will demonstrate increased R shoulder AROM to 160 flex pain-free to allow for proper joint functioning as indicated by patients Functional Deficits. [x]? Progressing: []? Met: [x]? Not Met: []? Adjusted  3. Patient will demonstrate an increase in R shoulder strength to 4+/5 flex, abd, ER  in UE to allow for proper functional mobility as indicated by patients Functional Deficits. [x]? Progressing: []? Met: [x]? Not Met: []? Adjusted  4. Patient will return to ADLs over shoulder height including brushing hair without increased symptoms or restriction. [x]? Progressing: []? Met: [x]? Not Met: []? Adjusted  5. Pt will return to lifting and carrying kids at work without increased pain in shoulders B  [x]? Progressing: []? Met: [x]? Not Met: []? Adjusted          Overall Progression Towards Functional goals/ Treatment Progress Update:  [x] Patient is progressing as expected towards functional goals listed. [] Progression is slowed due to complexities/Impairments listed. [] Progression has been slowed due to co-morbidities. [] Plan just implemented, too soon to assess goals progression <30days   [] Goals require adjustment due to lack of progress  [] Patient is not progressing as expected and requires additional follow up with physician  [] Other    Prognosis for POC: [x] Good [] Fair  [] Poor      Patient requires continued skilled intervention: [x] Yes  [] No      ASSESSMENT:      Treatment/Activity Tolerance:  [x] Patient tolerated treatment well [] Patient limited by fatique  [] Patient limited by pain  [] Patient limited by other medical complications  [x] Other:Pt has shown improved ROM and strength since starting PT. However she still has pain with active elevation overhead, some weakness and discomfort resisting RC, and reports continued pain in shoulder each day. She was educated on trying to avoid UT compensation/shoulder hike with reaching overhead and during exercises to avoid continued tightness in UT/LS muscles. She was able to increase resistance on several exercises in session today also showing improving strength. Pt will follow up with MD this week to discuss plan. If plan is to continue with PT, recommend continued ROM and strengthening of RC and scapular muscles to improve pts ability to reach overhead and decrease pain with ADLs.        Return to Play: (if applicable)   []  Stage 1: Intro to Strength   []  Stage 2: Return to Run and Strength   []  Stage 3: Return to Jump and Strength   []  Stage 4: Dynamic Strength and Agility   []  Stage 5: Sport Specific Training     []  Ready to Return to Play, Meets All Above Stages   []  Not Ready for Return to Sports   Comments:            Prognosis: [x] Good [] Fair  [] Poor    Patient Requires Follow-up: [x] Yes  [] No    PLAN:fu after MD appt; 1-2x/week for 5 more weeks (3/28/22); consider IASTM to UT/RC as needed; BB stretching again  [x] Continue per plan of care [] Alter current plan (see comments above)  [] Plan of care initiated [x] Hold pending MD visit [] Discharge    Note: If patient does not return for scheduled/ recommended follow up visits, this note will serve as a discharge from care along with most recent update on progress.      Electronically signed by: Paul Chow, PT, DPT

## 2022-03-31 ENCOUNTER — OFFICE VISIT (OUTPATIENT)
Dept: ORTHOPEDIC SURGERY | Age: 62
End: 2022-03-31
Payer: MEDICAID

## 2022-03-31 VITALS — RESPIRATION RATE: 12 BRPM | HEIGHT: 63 IN | BODY MASS INDEX: 26.58 KG/M2 | WEIGHT: 150 LBS

## 2022-03-31 DIAGNOSIS — M25.511 CHRONIC RIGHT SHOULDER PAIN: Primary | ICD-10-CM

## 2022-03-31 DIAGNOSIS — M75.41 IMPINGEMENT SYNDROME OF RIGHT SHOULDER: ICD-10-CM

## 2022-03-31 DIAGNOSIS — G89.29 CHRONIC RIGHT SHOULDER PAIN: Primary | ICD-10-CM

## 2022-03-31 PROCEDURE — G8419 CALC BMI OUT NRM PARAM NOF/U: HCPCS | Performed by: ORTHOPAEDIC SURGERY

## 2022-03-31 PROCEDURE — 1036F TOBACCO NON-USER: CPT | Performed by: ORTHOPAEDIC SURGERY

## 2022-03-31 PROCEDURE — G8484 FLU IMMUNIZE NO ADMIN: HCPCS | Performed by: ORTHOPAEDIC SURGERY

## 2022-03-31 PROCEDURE — G8427 DOCREV CUR MEDS BY ELIG CLIN: HCPCS | Performed by: ORTHOPAEDIC SURGERY

## 2022-03-31 PROCEDURE — 3017F COLORECTAL CA SCREEN DOC REV: CPT | Performed by: ORTHOPAEDIC SURGERY

## 2022-03-31 PROCEDURE — 99212 OFFICE O/P EST SF 10 MIN: CPT | Performed by: ORTHOPAEDIC SURGERY

## 2022-03-31 NOTE — PROGRESS NOTES
Aamir Craven returns today to follow-up her right shoulder. She does not feel like a cortisone injection has been helpful. 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 General Exam:    Vitals: Resp. rate 12, height 5' 2.5\" (1.588 m), weight 150 lb (68 kg), last menstrual period 10/03/2011, not currently breastfeeding. Constitutional: Patient is adequately groomed with no evidence of malnutrition  Mental Status: The patient is oriented to time, place and person. The patient's mood and affect are appropriate. Right shoulder today has a mild hike with abduction and mild crepitation. Strength in the supraspinatus 4+/5. She has no discrete AC or SC joint pain. Assessment: Ongoing right shoulder pain despite injections and therapy. Plan: MRI right shoulder.   Follow-up with me after the scan

## 2022-04-07 ENCOUNTER — OFFICE VISIT (OUTPATIENT)
Dept: ORTHOPEDIC SURGERY | Age: 62
End: 2022-04-07
Payer: MEDICAID

## 2022-04-07 VITALS — WEIGHT: 150 LBS | HEIGHT: 63 IN | RESPIRATION RATE: 12 BRPM | BODY MASS INDEX: 26.58 KG/M2

## 2022-04-07 DIAGNOSIS — M75.41 IMPINGEMENT SYNDROME OF RIGHT SHOULDER: ICD-10-CM

## 2022-04-07 DIAGNOSIS — M25.511 CHRONIC RIGHT SHOULDER PAIN: Primary | ICD-10-CM

## 2022-04-07 DIAGNOSIS — G89.29 CHRONIC RIGHT SHOULDER PAIN: Primary | ICD-10-CM

## 2022-04-07 PROCEDURE — G8419 CALC BMI OUT NRM PARAM NOF/U: HCPCS | Performed by: ORTHOPAEDIC SURGERY

## 2022-04-07 PROCEDURE — 99214 OFFICE O/P EST MOD 30 MIN: CPT | Performed by: ORTHOPAEDIC SURGERY

## 2022-04-07 PROCEDURE — G8428 CUR MEDS NOT DOCUMENT: HCPCS | Performed by: ORTHOPAEDIC SURGERY

## 2022-04-07 PROCEDURE — 1036F TOBACCO NON-USER: CPT | Performed by: ORTHOPAEDIC SURGERY

## 2022-04-07 PROCEDURE — 3017F COLORECTAL CA SCREEN DOC REV: CPT | Performed by: ORTHOPAEDIC SURGERY

## 2022-04-07 NOTE — PROGRESS NOTES
Josi Rodrigues returns today for her right shoulder. She reports pain is 2 out of 10. We obtained her MRI. General Exam:    Vitals: Resp. rate 12, height 5' 2.5\" (1.588 m), weight 150 lb (68 kg), last menstrual period 10/03/2011, not currently breastfeeding. Constitutional: Patient is adequately groomed with no evidence of malnutrition  Mental Status: The patient is oriented to time, place and person. The patient's mood and affect are appropriate. Shoulder today continues to have a moderate hike with abduction and pain stressing the supraspinatus. Neurologic and vascular exams are normal.  Infraspinatus strength is normal.    Neurologic and vascular exams the right upper extremity normal.    Right shoulder MRI is reviewed. It demonstrates:  AC arthropathy.  Broad subacromial spur.       Moderate cuff tendinosis.  Slit-like partial thickness interstitial bursal surface tear    supraspinatus insertion is 5mm.  Pseudocysts greater tuberosity.       Biceps arcuate segment is tendinopathic.       Worn labrum.  Capsulitis.  No soft tissue mass.       CONCLUSION:   1. Frayed 5mm partial thickness interstitial and bursal surface tear supraspinatus insertion    less than 50% of the tendon thickness.  Mild bursitis.  Underlying pseudocysts greater    tuberosity. 2. Mild AC arthropathy.         Reviewed these findings and the report and the images with the patient. Assessment: Partial-thickness rotator cuff tear right shoulder. She has had only minimal improvement with injection and therapy. Plan: We reviewed the risks, benefits, and alternatives to surgery. The alternatives include conservative management including medications, injections, and physical therapy as well as observation. Risks of surgery include but are not limited to persistent pain, instability, and reinjury. Risks also include risk of infection which could result in the need for further surgery and long-term use of antibiotics.   Risks also include deep venous thromboses and pulmonary emboli. Risks also include problems with anesthesia including but not limited to cardiovascular compromise , stroke,  and death. The patient understands that the goal of surgery is to improve pain and function but that can never be guaranteed. At this point in recommending she modify her swim lesson activities. If she has persistent pain despite continued therapy and modification of activities I would consider surgical treatment in a couple of months. We discussed this at length and she is in agreement with this.   Follow-up with me in 2 months    Medical decision making today was moderate

## 2022-04-15 ENCOUNTER — OFFICE VISIT (OUTPATIENT)
Dept: INTERNAL MEDICINE CLINIC | Age: 62
End: 2022-04-15
Payer: MEDICAID

## 2022-04-15 VITALS
HEART RATE: 82 BPM | BODY MASS INDEX: 28.52 KG/M2 | DIASTOLIC BLOOD PRESSURE: 87 MMHG | HEIGHT: 62 IN | WEIGHT: 155 LBS | SYSTOLIC BLOOD PRESSURE: 138 MMHG | OXYGEN SATURATION: 98 %

## 2022-04-15 DIAGNOSIS — E78.2 MIXED HYPERLIPIDEMIA: ICD-10-CM

## 2022-04-15 DIAGNOSIS — K21.00 GASTROESOPHAGEAL REFLUX DISEASE WITH ESOPHAGITIS, UNSPECIFIED WHETHER HEMORRHAGE: ICD-10-CM

## 2022-04-15 DIAGNOSIS — Z00.01 ENCOUNTER FOR WELL ADULT EXAM WITH ABNORMAL FINDINGS: Primary | ICD-10-CM

## 2022-04-15 LAB
A/G RATIO: 1.7 (ref 1.1–2.2)
ALBUMIN SERPL-MCNC: 4.8 G/DL (ref 3.4–5)
ALP BLD-CCNC: 126 U/L (ref 40–129)
ALT SERPL-CCNC: 48 U/L (ref 10–40)
ANION GAP SERPL CALCULATED.3IONS-SCNC: 13 MMOL/L (ref 3–16)
AST SERPL-CCNC: 38 U/L (ref 15–37)
BASOPHILS ABSOLUTE: 0.1 K/UL (ref 0–0.2)
BASOPHILS RELATIVE PERCENT: 1.1 %
BILIRUB SERPL-MCNC: 0.3 MG/DL (ref 0–1)
BILIRUBIN DIRECT: <0.2 MG/DL (ref 0–0.3)
BILIRUBIN, INDIRECT: ABNORMAL MG/DL (ref 0–1)
BUN BLDV-MCNC: 21 MG/DL (ref 7–20)
CALCIUM SERPL-MCNC: 9.6 MG/DL (ref 8.3–10.6)
CHLORIDE BLD-SCNC: 102 MMOL/L (ref 99–110)
CHOLESTEROL, TOTAL: 284 MG/DL (ref 0–199)
CO2: 24 MMOL/L (ref 21–32)
CREAT SERPL-MCNC: 0.8 MG/DL (ref 0.6–1.2)
EOSINOPHILS ABSOLUTE: 0.2 K/UL (ref 0–0.6)
EOSINOPHILS RELATIVE PERCENT: 2.6 %
GFR AFRICAN AMERICAN: >60
GFR NON-AFRICAN AMERICAN: >60
GLUCOSE BLD-MCNC: 112 MG/DL (ref 70–99)
HCT VFR BLD CALC: 43.7 % (ref 36–48)
HDLC SERPL-MCNC: 52 MG/DL (ref 40–60)
HEMOGLOBIN: 14.7 G/DL (ref 12–16)
LDL CHOLESTEROL CALCULATED: 173 MG/DL
LYMPHOCYTES ABSOLUTE: 2.7 K/UL (ref 1–5.1)
LYMPHOCYTES RELATIVE PERCENT: 32.7 %
MCH RBC QN AUTO: 33.4 PG (ref 26–34)
MCHC RBC AUTO-ENTMCNC: 33.6 G/DL (ref 31–36)
MCV RBC AUTO: 99.5 FL (ref 80–100)
MONOCYTES ABSOLUTE: 0.5 K/UL (ref 0–1.3)
MONOCYTES RELATIVE PERCENT: 6.6 %
NEUTROPHILS ABSOLUTE: 4.7 K/UL (ref 1.7–7.7)
NEUTROPHILS RELATIVE PERCENT: 57 %
PDW BLD-RTO: 13 % (ref 12.4–15.4)
PLATELET # BLD: 221 K/UL (ref 135–450)
PMV BLD AUTO: 9.7 FL (ref 5–10.5)
POTASSIUM SERPL-SCNC: 4.2 MMOL/L (ref 3.5–5.1)
RBC # BLD: 4.4 M/UL (ref 4–5.2)
SODIUM BLD-SCNC: 139 MMOL/L (ref 136–145)
TOTAL PROTEIN: 7.7 G/DL (ref 6.4–8.2)
TRIGL SERPL-MCNC: 297 MG/DL (ref 0–150)
TSH REFLEX: 1.74 UIU/ML (ref 0.27–4.2)
VITAMIN D 25-HYDROXY: 24.1 NG/ML
VLDLC SERPL CALC-MCNC: 59 MG/DL
WBC # BLD: 8.2 K/UL (ref 4–11)

## 2022-04-15 PROCEDURE — 99396 PREV VISIT EST AGE 40-64: CPT | Performed by: INTERNAL MEDICINE

## 2022-04-15 PROCEDURE — 36415 COLL VENOUS BLD VENIPUNCTURE: CPT | Performed by: INTERNAL MEDICINE

## 2022-04-15 SDOH — ECONOMIC STABILITY: FOOD INSECURITY: WITHIN THE PAST 12 MONTHS, THE FOOD YOU BOUGHT JUST DIDN'T LAST AND YOU DIDN'T HAVE MONEY TO GET MORE.: NEVER TRUE

## 2022-04-15 SDOH — ECONOMIC STABILITY: FOOD INSECURITY: WITHIN THE PAST 12 MONTHS, YOU WORRIED THAT YOUR FOOD WOULD RUN OUT BEFORE YOU GOT MONEY TO BUY MORE.: NEVER TRUE

## 2022-04-15 ASSESSMENT — PATIENT HEALTH QUESTIONNAIRE - PHQ9
SUM OF ALL RESPONSES TO PHQ QUESTIONS 1-9: 0
1. LITTLE INTEREST OR PLEASURE IN DOING THINGS: 0
SUM OF ALL RESPONSES TO PHQ9 QUESTIONS 1 & 2: 0
SUM OF ALL RESPONSES TO PHQ QUESTIONS 1-9: 0
2. FEELING DOWN, DEPRESSED OR HOPELESS: 0
DEPRESSION UNABLE TO ASSESS: FUNCTIONAL CAPACITY MOTIVATION LIMITS ACCURACY
SUM OF ALL RESPONSES TO PHQ QUESTIONS 1-9: 0
SUM OF ALL RESPONSES TO PHQ QUESTIONS 1-9: 0

## 2022-04-15 ASSESSMENT — ENCOUNTER SYMPTOMS
ABDOMINAL PAIN: 0
NAUSEA: 0
SORE THROAT: 0
BLOOD IN STOOL: 0
COUGH: 0
VOMITING: 0
SHORTNESS OF BREATH: 0

## 2022-04-15 ASSESSMENT — SOCIAL DETERMINANTS OF HEALTH (SDOH): HOW HARD IS IT FOR YOU TO PAY FOR THE VERY BASICS LIKE FOOD, HOUSING, MEDICAL CARE, AND HEATING?: NOT HARD AT ALL

## 2022-04-15 NOTE — PROGRESS NOTES
4/15/2022    Janel Meigs (:  1960) is a 62 y.o. female, here for a preventive medicine evaluation. Patient Active Problem List   Diagnosis    Lateral epicondylitis of left elbow    Gastroesophageal reflux disease with esophagitis    Primary osteoarthritis involving multiple joints    Mixed hyperlipidemia       Review of Systems   Constitutional: Negative for fatigue and fever. HENT: Negative for nosebleeds and sore throat. Respiratory: Negative for cough and shortness of breath. Cardiovascular: Negative for chest pain, palpitations and leg swelling. Gastrointestinal: Negative for abdominal pain, blood in stool, nausea and vomiting. Neurological: Negative for dizziness and weakness. Prior to Visit Medications    Medication Sig Taking?  Authorizing Provider   pantoprazole (PROTONIX) 40 MG tablet Take 1 tablet by mouth every morning (before breakfast) Yes Quinten Salgado MD   Nutritional Supplements (NUTRITIONAL SUPPLEMENT PO) Take 1 capsule by mouth 2 times daily GUT ALIVE Yes Historical Provider, MD   Misc Natural Products (SUPER GREENS PO) Take 2 capsules by mouth daily Yes Historical Provider, MD   calcium carbonate (TUMS) 500 MG chewable tablet Take 1 tablet by mouth daily Yes Historical Provider, MD   valACYclovir (VALTREX) 500 MG tablet TK 1 T PO  D Yes Historical Provider, MD   ASTAXANTHIN PO Take 1 Bar by mouth daily  Patient not taking: Reported on 4/15/2022  Historical Provider, MD        Allergies   Allergen Reactions    Codeine Nausea And Vomiting       Past Medical History:   Diagnosis Date    Fibromyalgia     Gastroesophageal reflux disease with esophagitis 2017    Lateral epicondylitis of left elbow 2016    Mixed hyperlipidemia 2017    Primary osteoarthritis involving multiple joints 2017       Past Surgical History:   Procedure Laterality Date    BLADDER SURGERY      CHOLECYSTECTOMY      laparoscopic     SINUS SURGERY Social History     Socioeconomic History    Marital status:      Spouse name: Not on file    Number of children: Not on file    Years of education: Not on file    Highest education level: Not on file   Occupational History    Occupation: childrens services supervisor    Tobacco Use    Smoking status: Former Smoker     Packs/day: 0.50     Years: 30.00     Pack years: 15.00     Types: Cigarettes     Start date: 1976     Quit date: 2014     Years since quittin.2    Smokeless tobacco: Never Used   Substance and Sexual Activity    Alcohol use: No    Drug use: Yes     Types: Marijuana (Weed)     Comment: 4-5 times per week     Sexual activity: Yes   Other Topics Concern    Not on file   Social History Narrative    Not on file     Social Determinants of Health     Financial Resource Strain: Low Risk     Difficulty of Paying Living Expenses: Not hard at all   Food Insecurity: No Food Insecurity    Worried About [employer] [employer] in the Last Year: Never true    [facility] in the Last Year: Never true   Transportation Needs:     Lack of Transportation (Medical): Not on file    Lack of Transportation (Non-Medical):  Not on file   Physical Activity:     Days of Exercise per Week: Not on file    Minutes of Exercise per Session: Not on file   Stress:     Feeling of Stress : Not on file   Social Connections:     Frequency of Communication with Friends and Family: Not on file    Frequency of Social Gatherings with Friends and Family: Not on file    Attends [employer] Services: Not on file    Active Member of Clubs or Organizations: Not on file    Attends Club or Organization Meetings: Not on file    Marital Status: Not on file   Intimate Partner Violence:     Fear of Current or Ex-Partner: Not on file    Emotionally Abused: Not on file    Physically Abused: Not on file    Sexually Abused: Not on file   Housing Stability:     Unable to Pay for Housing in the Last Year: Not on file    Number of Places Lived in the Last Year: Not on file    Unstable Housing in the Last Year: Not on file        Family History   Problem Relation Age of Onset    Breast Cancer Mother     Stroke Father     High Cholesterol Father     High Blood Pressure Father     Diabetes Maternal Aunt     High Blood Pressure Maternal Aunt     Diabetes Maternal Uncle     High Blood Pressure Maternal Uncle        ADVANCE DIRECTIVE: N, <no information>    Vitals:    04/15/22 0837   BP: 138/87   Pulse: 82   SpO2: 98%   Weight: 155 lb (70.3 kg)   Height: 5' 2\" (1.575 m)     Estimated body mass index is 28.35 kg/m² as calculated from the following:    Height as of this encounter: 5' 2\" (1.575 m). Weight as of this encounter: 155 lb (70.3 kg). Physical Exam  Vitals reviewed. Constitutional:       General: She is not in acute distress. Appearance: Normal appearance. HENT:      Head: Normocephalic. Mouth/Throat:      Mouth: Mucous membranes are moist.      Pharynx: Oropharynx is clear. No oropharyngeal exudate or posterior oropharyngeal erythema. Eyes:      General: No scleral icterus. Right eye: No discharge. Left eye: No discharge. Extraocular Movements: Extraocular movements intact. Conjunctiva/sclera: Conjunctivae normal.      Pupils: Pupils are equal, round, and reactive to light. Cardiovascular:      Rate and Rhythm: Normal rate and regular rhythm. Pulses: Normal pulses. Heart sounds: Normal heart sounds. No murmur heard. Pulmonary:      Effort: Pulmonary effort is normal. No respiratory distress. Breath sounds: Normal breath sounds. No stridor. No wheezing. Abdominal:      General: Abdomen is flat. Bowel sounds are normal. There is no distension. Palpations: Abdomen is soft. Tenderness: There is no abdominal tenderness. There is no guarding or rebound. Musculoskeletal:         General: Normal range of motion.       Cervical back: Normal range of motion and neck supple. No tenderness. Right lower leg: No edema. Left lower leg: No edema. Skin:     General: Skin is warm. Neurological:      General: No focal deficit present. Mental Status: She is alert and oriented to person, place, and time. Mental status is at baseline. Motor: No weakness. Psychiatric:         Mood and Affect: Mood normal.         Behavior: Behavior normal.         No flowsheet data found.     Lab Results   Component Value Date    CHOL 214 11/22/2019    CHOL 240 06/14/2017    TRIG 224 11/22/2019    TRIG 221 06/14/2017    HDL 56 11/22/2019    HDL 51 06/14/2017    LDLCALC 113 11/22/2019    LDLCALC 145 06/14/2017    GLUCOSE 99 11/22/2019    LABA1C 5.9 11/22/2019       The 10-year ASCVD risk score (Ernesto Pritchett, et al., 2013) is: 4%    Values used to calculate the score:      Age: 62 years      Sex: Female      Is Non- : No      Diabetic: No      Tobacco smoker: No      Systolic Blood Pressure: 617 mmHg      Is BP treated: No      HDL Cholesterol: 56 mg/dL      Total Cholesterol: 214 mg/dL    Immunization History   Administered Date(s) Administered    COVID-19, Pfizer Purple top, DILUTE for use, 12+ yrs, 30mcg/0.3mL dose 07/22/2021, 08/21/2021    Influenza Vaccine, unspecified formulation 11/04/2013, 11/01/2016    Influenza Virus Vaccine 11/05/2018    Influenza, Doralee Age, IM, PF (6 mo and older Fluzone, Flulaval, Fluarix, and 3 yrs and older Afluria) 11/11/2016, 11/05/2018, 11/14/2019, 10/22/2020    Tdap (Boostrix, Adacel) 11/14/2019    Zoster Recombinant (Shingrix) 01/08/2021, 04/08/2021       Health Maintenance   Topic Date Due    Cervical cancer screen  Never done    Breast cancer screen  12/18/2019    A1C test (Diabetic or Prediabetic)  11/22/2020    Depression Screen  01/08/2022    COVID-19 Vaccine (3 - Booster for Pfizer series) 01/21/2022    Flu vaccine (Season Ended) 09/01/2022    Lipid screen  11/22/2024    DTaP/Tdap/Td vaccine (2 - Td or Tdap) 11/14/2029    Colorectal Cancer Screen  02/01/2031    Shingles Vaccine  Completed    Hepatitis C screen  Completed    HIV screen  Completed    Hepatitis A vaccine  Aged Out    Hepatitis B vaccine  Aged Out    Hib vaccine  Aged Out    Meningococcal (ACWY) vaccine  Aged Out    Pneumococcal 0-64 years Vaccine  Aged Out       Assessment & Plan   Encounter for well adult exam with abnormal findings  -     Vitamin D 25 Hydroxy; Future  -     CBC with Auto Differential; Future  -     Comprehensive Metabolic Panel; Future  -     Hepatic Function Panel; Future  -     Hemoglobin A1C; Future  -     Lipid Panel; Future  -     TSH with Reflex; Future  Gastroesophageal reflux disease with esophagitis, unspecified whether hemorrhage  - Stable   - Continue current dose of pantoprazole  Mixed hyperlipidemia  -     Lipid Panel; Future    Return in about 6 months (around 10/15/2022).          --Felicia Pritchard MD

## 2022-04-16 LAB
ESTIMATED AVERAGE GLUCOSE: 128.4 MG/DL
HBA1C MFR BLD: 6.1 %

## 2022-04-19 RX ORDER — MELATONIN
1000 DAILY
Qty: 30 TABLET | Refills: 5 | Status: SHIPPED | OUTPATIENT
Start: 2022-04-19

## 2022-07-08 DIAGNOSIS — K21.00 GASTROESOPHAGEAL REFLUX DISEASE WITH ESOPHAGITIS, UNSPECIFIED WHETHER HEMORRHAGE: Chronic | ICD-10-CM

## 2022-07-08 RX ORDER — PANTOPRAZOLE SODIUM 40 MG/1
TABLET, DELAYED RELEASE ORAL
Qty: 30 TABLET | Refills: 3 | Status: SHIPPED | OUTPATIENT
Start: 2022-07-08

## 2022-07-08 NOTE — TELEPHONE ENCOUNTER
Last office visit :04/15/2022    Future Appointments   Date Time Provider Jonah Bhakta   7/21/2022  9:00 AM Rex Wynn MD [facility]

## 2022-07-21 ENCOUNTER — OFFICE VISIT (OUTPATIENT)
Dept: INTERNAL MEDICINE CLINIC | Age: 62
End: 2022-07-21
Payer: MEDICAID

## 2022-07-21 VITALS
HEIGHT: 63 IN | TEMPERATURE: 97.6 F | SYSTOLIC BLOOD PRESSURE: 118 MMHG | OXYGEN SATURATION: 97 % | DIASTOLIC BLOOD PRESSURE: 74 MMHG | RESPIRATION RATE: 16 BRPM | WEIGHT: 153.2 LBS | HEART RATE: 77 BPM | BODY MASS INDEX: 27.14 KG/M2

## 2022-07-21 DIAGNOSIS — K21.00 GASTROESOPHAGEAL REFLUX DISEASE WITH ESOPHAGITIS, UNSPECIFIED WHETHER HEMORRHAGE: Primary | ICD-10-CM

## 2022-07-21 DIAGNOSIS — R73.03 PREDIABETES: ICD-10-CM

## 2022-07-21 DIAGNOSIS — E78.2 MIXED HYPERLIPIDEMIA: ICD-10-CM

## 2022-07-21 DIAGNOSIS — E55.9 VITAMIN D DEFICIENCY: ICD-10-CM

## 2022-07-21 LAB
A/G RATIO: 1.7 (ref 1.1–2.2)
ALBUMIN SERPL-MCNC: 4.7 G/DL (ref 3.4–5)
ALP BLD-CCNC: 119 U/L (ref 40–129)
ALT SERPL-CCNC: 57 U/L (ref 10–40)
ANION GAP SERPL CALCULATED.3IONS-SCNC: 17 MMOL/L (ref 3–16)
AST SERPL-CCNC: 48 U/L (ref 15–37)
BILIRUB SERPL-MCNC: 0.3 MG/DL (ref 0–1)
BUN BLDV-MCNC: 23 MG/DL (ref 7–20)
CALCIUM SERPL-MCNC: 9.9 MG/DL (ref 8.3–10.6)
CHLORIDE BLD-SCNC: 106 MMOL/L (ref 99–110)
CHOLESTEROL, TOTAL: 271 MG/DL (ref 0–199)
CO2: 21 MMOL/L (ref 21–32)
CREAT SERPL-MCNC: 0.9 MG/DL (ref 0.6–1.2)
GFR AFRICAN AMERICAN: >60
GFR NON-AFRICAN AMERICAN: >60
GLUCOSE BLD-MCNC: 122 MG/DL (ref 70–99)
HDLC SERPL-MCNC: 48 MG/DL (ref 40–60)
LDL CHOLESTEROL CALCULATED: 172 MG/DL
POTASSIUM SERPL-SCNC: 4.6 MMOL/L (ref 3.5–5.1)
SODIUM BLD-SCNC: 144 MMOL/L (ref 136–145)
TOTAL PROTEIN: 7.4 G/DL (ref 6.4–8.2)
TRIGL SERPL-MCNC: 255 MG/DL (ref 0–150)
VLDLC SERPL CALC-MCNC: 51 MG/DL

## 2022-07-21 PROCEDURE — 99214 OFFICE O/P EST MOD 30 MIN: CPT | Performed by: INTERNAL MEDICINE

## 2022-07-21 PROCEDURE — G8419 CALC BMI OUT NRM PARAM NOF/U: HCPCS | Performed by: INTERNAL MEDICINE

## 2022-07-21 PROCEDURE — 36415 COLL VENOUS BLD VENIPUNCTURE: CPT | Performed by: INTERNAL MEDICINE

## 2022-07-21 PROCEDURE — G8427 DOCREV CUR MEDS BY ELIG CLIN: HCPCS | Performed by: INTERNAL MEDICINE

## 2022-07-21 PROCEDURE — 1036F TOBACCO NON-USER: CPT | Performed by: INTERNAL MEDICINE

## 2022-07-21 PROCEDURE — 3017F COLORECTAL CA SCREEN DOC REV: CPT | Performed by: INTERNAL MEDICINE

## 2022-07-21 ASSESSMENT — ENCOUNTER SYMPTOMS
SORE THROAT: 0
VOMITING: 0
ABDOMINAL PAIN: 0
SHORTNESS OF BREATH: 0
NAUSEA: 0
COUGH: 0
BLOOD IN STOOL: 0

## 2022-07-21 ASSESSMENT — PATIENT HEALTH QUESTIONNAIRE - PHQ9
SUM OF ALL RESPONSES TO PHQ9 QUESTIONS 1 & 2: 0
SUM OF ALL RESPONSES TO PHQ QUESTIONS 1-9: 0
2. FEELING DOWN, DEPRESSED OR HOPELESS: 0
SUM OF ALL RESPONSES TO PHQ QUESTIONS 1-9: 0
1. LITTLE INTEREST OR PLEASURE IN DOING THINGS: 0
SUM OF ALL RESPONSES TO PHQ QUESTIONS 1-9: 0
SUM OF ALL RESPONSES TO PHQ QUESTIONS 1-9: 0

## 2022-07-21 NOTE — PROGRESS NOTES
2022    Joanie Brittle (:  1960) is a 62 y.o. female, here for a preventive medicine evaluation. Patient Active Problem List   Diagnosis    Lateral epicondylitis of left elbow    Gastroesophageal reflux disease with esophagitis    Primary osteoarthritis involving multiple joints    Mixed hyperlipidemia       Review of Systems   Constitutional:  Negative for fatigue and fever. HENT:  Negative for nosebleeds and sore throat. Respiratory:  Negative for cough and shortness of breath. Cardiovascular:  Negative for chest pain, palpitations and leg swelling. Gastrointestinal:  Negative for abdominal pain, blood in stool, nausea and vomiting. Neurological:  Negative for dizziness and weakness. Prior to Visit Medications    Medication Sig Taking?  Authorizing Provider   pantoprazole (PROTONIX) 40 MG tablet TAKE 1 TABLET BY MOUTH EVERY MORNING BEFORE BREAKFAST Yes Connie Zazueta MD   vitamin D3 (CHOLECALCIFEROL) 25 MCG (1000 UT) TABS tablet Take 1 tablet by mouth daily Yes Connie Zazueta MD   Nutritional Supplements (NUTRITIONAL SUPPLEMENT PO) Take 1 capsule by mouth 2 times daily GUT ALIVE Yes Historical Provider, MD   Misc Natural Products (SUPER GREENS PO) Take 2 capsules by mouth daily Yes Historical Provider, MD   calcium carbonate (TUMS) 500 MG chewable tablet Take 1 tablet by mouth daily Yes Historical Provider, MD   valACYclovir (VALTREX) 500 MG tablet TK 1 T PO  D Yes Historical Provider, MD   ASTAXANTHIN PO Take 1 Bar by mouth daily  Patient not taking: Reported on 4/15/2022  Historical Provider, MD        Allergies   Allergen Reactions    Codeine Nausea And Vomiting       Past Medical History:   Diagnosis Date    Fibromyalgia     Gastroesophageal reflux disease with esophagitis 2017    Lateral epicondylitis of left elbow 2016    Mixed hyperlipidemia 2017    Primary osteoarthritis involving multiple joints 2017       Past Surgical History: Procedure Laterality Date    BLADDER SURGERY      CHOLECYSTECTOMY      laparoscopic     MOUTH SURGERY N/A     upper bone in mouth    SINUS SURGERY         Social History     Socioeconomic History    Marital status:      Spouse name: Not on file    Number of children: Not on file    Years of education: Not on file    Highest education level: Not on file   Occupational History    Occupation: childrens services supervisor    Tobacco Use    Smoking status: Former     Packs/day: 0.50     Years: 30.00     Pack years: 15.00     Types: Cigarettes     Start date: 1976     Quit date: 2014     Years since quittin.5    Smokeless tobacco: Never   Vaping Use    Vaping Use: Never used   Substance and Sexual Activity    Alcohol use: No    Drug use: Yes     Types: Marijuana (Weed)     Comment: 4-5 times per week     Sexual activity: Yes   Other Topics Concern    Not on file   Social History Narrative    Not on file     Social Determinants of Health     Financial Resource Strain: Low Risk     Difficulty of Paying Living Expenses: Not hard at all   Food Insecurity: No Food Insecurity    Worried About Running Out of Food in the Last Year: Never true    Ran Out of Food in the Last Year: Never true   Transportation Needs: Not on file   Physical Activity: Not on file   Stress: Not on file   Social Connections: Not on file   Intimate Partner Violence: Not on file   Housing Stability: Not on file        Family History   Problem Relation Age of Onset    Breast Cancer Mother     Stroke Father     High Cholesterol Father     High Blood Pressure Father     Diabetes Maternal Aunt     High Blood Pressure Maternal Aunt     Diabetes Maternal Uncle     High Blood Pressure Maternal Uncle        ADVANCE DIRECTIVE: N, <no information>    Vitals:    22 0906   BP: 118/74   Site: Left Upper Arm   Position: Sitting   Cuff Size: Medium Adult   Pulse: 77   Resp: 16   Temp: 97.6 °F (36.4 °C)   TempSrc: Temporal   SpO2: 97% Weight: 153 lb 3.2 oz (69.5 kg)   Height: 5' 2.5\" (1.588 m)     Estimated body mass index is 27.57 kg/m² as calculated from the following:    Height as of this encounter: 5' 2.5\" (1.588 m). Weight as of this encounter: 153 lb 3.2 oz (69.5 kg). Physical Exam  Vitals reviewed. Constitutional:       General: She is not in acute distress. Appearance: Normal appearance. HENT:      Head: Normocephalic. Mouth/Throat:      Mouth: Mucous membranes are moist.      Pharynx: Oropharynx is clear. No oropharyngeal exudate or posterior oropharyngeal erythema. Eyes:      General: No scleral icterus. Right eye: No discharge. Left eye: No discharge. Extraocular Movements: Extraocular movements intact. Conjunctiva/sclera: Conjunctivae normal.      Pupils: Pupils are equal, round, and reactive to light. Cardiovascular:      Rate and Rhythm: Normal rate and regular rhythm. Pulses: Normal pulses. Heart sounds: Normal heart sounds. No murmur heard. Pulmonary:      Effort: Pulmonary effort is normal. No respiratory distress. Breath sounds: Normal breath sounds. No stridor. No wheezing. Abdominal:      General: Abdomen is flat. Bowel sounds are normal. There is no distension. Palpations: Abdomen is soft. Tenderness: There is no abdominal tenderness. There is no guarding or rebound. Musculoskeletal:         General: Normal range of motion. Cervical back: Normal range of motion and neck supple. No tenderness. Right lower leg: No edema. Left lower leg: No edema. Skin:     General: Skin is warm. Neurological:      General: No focal deficit present. Mental Status: She is alert and oriented to person, place, and time. Mental status is at baseline. Motor: No weakness. Psychiatric:         Mood and Affect: Mood normal.         Behavior: Behavior normal.       No flowsheet data found.     Lab Results   Component Value Date/Time    CHOL 284 04/15/2022 08:53 AM    CHOL 214 11/22/2019 08:33 AM    CHOL 240 06/14/2017 09:40 AM    TRIG 297 04/15/2022 08:53 AM    TRIG 224 11/22/2019 08:33 AM    TRIG 221 06/14/2017 09:40 AM    HDL 52 04/15/2022 08:53 AM    HDL 56 11/22/2019 08:33 AM    HDL 51 06/14/2017 09:40 AM    LDLCALC 173 04/15/2022 08:53 AM    LDLCALC 113 11/22/2019 08:33 AM    LDLCALC 145 06/14/2017 09:40 AM    GLUCOSE 112 04/15/2022 08:53 AM    LABA1C 6.1 04/15/2022 08:53 AM    LABA1C 5.9 11/22/2019 08:33 AM       The 10-year ASCVD risk score (Tyler Councilman., et al., 2013) is: 3.8%    Values used to calculate the score:      Age: 62 years      Sex: Female      Is Non- : No      Diabetic: No      Tobacco smoker: No      Systolic Blood Pressure: 305 mmHg      Is BP treated: No      HDL Cholesterol: 52 mg/dL      Total Cholesterol: 284 mg/dL    Immunization History   Administered Date(s) Administered    COVID-19, PFIZER PURPLE top, DILUTE for use, (age 15 y+), 30mcg/0.3mL 07/22/2021, 08/21/2021    Influenza Vaccine, unspecified formulation 11/04/2013, 11/01/2016    Influenza Virus Vaccine 11/05/2018    Influenza, Quadv, IM, PF (6 mo and older Fluzone, Flulaval, Fluarix, and 3 yrs and older Afluria) 11/11/2016, 11/05/2018, 11/14/2019, 10/22/2020    Tdap (Boostrix, Adacel) 11/14/2019    Zoster Recombinant (Shingrix) 01/08/2021, 04/08/2021       Health Maintenance   Topic Date Due    Cervical cancer screen  Never done    Breast cancer screen  12/18/2019    COVID-19 Vaccine (3 - Booster for Pfizer series) 01/21/2022    Flu vaccine (1) 09/01/2022    A1C test (Diabetic or Prediabetic)  04/15/2023    Depression Screen  04/15/2023    Lipids  04/15/2027    DTaP/Tdap/Td vaccine (2 - Td or Tdap) 11/14/2029    Colorectal Cancer Screen  02/01/2031    Shingles vaccine  Completed    Hepatitis C screen  Completed    HIV screen  Completed    Hepatitis A vaccine  Aged Out    Hepatitis B vaccine  Aged Out    Hib vaccine  Aged Out    Meningococcal (ACWY) vaccine  Aged Out    Pneumococcal 0-64 years Vaccine  Aged Out       Assessment & Plan   Encounter for well adult exam with abnormal findings  -     Vitamin D 25 Hydroxy; Future  -     CBC with Auto Differential; Future  -     Comprehensive Metabolic Panel; Future  -     Hepatic Function Panel; Future  -     Hemoglobin A1C; Future  -     Lipid Panel; Future  -     TSH with Reflex; Future  Gastroesophageal reflux disease with esophagitis, unspecified whether hemorrhage  - Stable   - Continue current dose of pantoprazole  Mixed hyperlipidemia  -     Lipid Panel; Future    No follow-ups on file.          --Denzel Murphy MD

## 2022-07-21 NOTE — PROGRESS NOTES
Keaton Gleason (:  1960) is a 62 y.o. female, Established patient, here for evaluation of the following chief complaint(s):  3 Month Follow-Up, Hyperlipidemia, and Diabetes (Per diabetes )         ASSESSMENT/PLAN:  1. Gastroesophageal reflux disease with esophagitis, unspecified whether hemorrhage  -     Comprehensive Metabolic Panel; Future  - Stable   - Continue current dose of pantoprazole, recommended to alternate with Pepcid every other day. 2. Mixed hyperlipidemia  Uncontrolled  -     Lipid Panel; Future  Follow lifestyle modifications by having diet low in fats and cholesterol and exercising 4-5 times/week. 3. Vitamin D deficiency  - Stable   - Continue current dose of cholecalciferol. 4. Prediabetes  -     Hemoglobin A1C; Future  Follow-up lifestyle modifications by having diet low in sugars and exercising 4-5 times/week. Return in about 3 months (around 10/21/2022). Subjective   SUBJECTIVE/OBJECTIVE:  Hyperlipidemia  This is a chronic problem. The current episode started more than 1 year ago. The problem is uncontrolled. Pertinent negatives include no chest pain or shortness of breath. Current antihyperlipidemic treatment includes diet change and exercise. There are no compliance problems. Risk factors for coronary artery disease include dyslipidemia. Diabetes  She presents for her follow-up diabetic visit. She has type 2 diabetes mellitus. Pertinent negatives for hypoglycemia include no dizziness. Pertinent negatives for diabetes include no chest pain, no fatigue and no weakness. Risk factors for coronary artery disease include dyslipidemia. Current diabetic treatment includes diet. She is compliant with treatment all of the time. She participates in exercise three times a week. Gastroesophageal Reflux  She reports no abdominal pain, no chest pain, no coughing, no nausea or no sore throat. This is a chronic problem. The current episode started more than 1 year ago.  The problem has been unchanged. The symptoms are aggravated by certain foods. Pertinent negatives include no fatigue. She has tried a PPI and a histamine-2 antagonist for the symptoms. The treatment provided significant relief. Past procedures include an EGD. Review of Systems   Constitutional:  Negative for fatigue and fever. HENT:  Negative for nosebleeds and sore throat. Respiratory:  Negative for cough and shortness of breath. Cardiovascular:  Negative for chest pain, palpitations and leg swelling. Gastrointestinal:  Negative for abdominal pain, blood in stool, nausea and vomiting. Neurological:  Negative for dizziness and weakness. Objective   Physical Exam  Constitutional:       Appearance: Normal appearance. HENT:      Head: Normocephalic and atraumatic. Eyes:      General: No scleral icterus. Conjunctiva/sclera: Conjunctivae normal.   Cardiovascular:      Rate and Rhythm: Normal rate and regular rhythm. Pulses: Normal pulses. Heart sounds: Normal heart sounds. Pulmonary:      Effort: Pulmonary effort is normal.      Breath sounds: Normal breath sounds. Musculoskeletal:         General: No swelling. Skin:     General: Skin is warm and dry. Neurological:      Mental Status: She is alert and oriented to person, place, and time. Mental status is at baseline. Psychiatric:         Mood and Affect: Mood normal.         Behavior: Behavior normal.            An electronic signature was used to authenticate this note.     --Marielos Alvarez MD

## 2022-07-22 DIAGNOSIS — E78.2 MIXED HYPERLIPIDEMIA: ICD-10-CM

## 2022-07-22 DIAGNOSIS — R79.89 ABNORMAL LFTS: Primary | ICD-10-CM

## 2022-07-22 LAB
ESTIMATED AVERAGE GLUCOSE: 128.4 MG/DL
HBA1C MFR BLD: 6.1 %

## 2022-07-22 RX ORDER — OMEGA-3-ACID ETHYL ESTERS 1 G/1
2 CAPSULE, LIQUID FILLED ORAL 2 TIMES DAILY
Qty: 60 CAPSULE | Refills: 3 | Status: SHIPPED | OUTPATIENT
Start: 2022-07-22

## 2022-12-13 ENCOUNTER — TELEPHONE (OUTPATIENT)
Dept: INTERNAL MEDICINE CLINIC | Age: 62
End: 2022-12-13

## 2022-12-13 NOTE — TELEPHONE ENCOUNTER
Pt has a deep cough & chest congestion & feeling tired x 10 days. Pt was in [facility]  from 12-1-22 thru 12-6-22. She wants to know if the doctor will see her. She said she did two home Covid tests and both were negative. Call patient.

## 2022-12-27 DIAGNOSIS — E78.2 MIXED HYPERLIPIDEMIA: ICD-10-CM

## 2022-12-27 RX ORDER — OMEGA-3-ACID ETHYL ESTERS 1 G/1
CAPSULE, LIQUID FILLED ORAL
Qty: 60 CAPSULE | Refills: 0 | Status: SHIPPED | OUTPATIENT
Start: 2022-12-27

## 2023-01-31 DIAGNOSIS — K21.00 GASTROESOPHAGEAL REFLUX DISEASE WITH ESOPHAGITIS, UNSPECIFIED WHETHER HEMORRHAGE: Chronic | ICD-10-CM

## 2023-01-31 RX ORDER — PANTOPRAZOLE SODIUM 40 MG/1
TABLET, DELAYED RELEASE ORAL
Qty: 30 TABLET | Refills: 3 | Status: SHIPPED | OUTPATIENT
Start: 2023-01-31

## 2023-10-17 ENCOUNTER — OFFICE VISIT (OUTPATIENT)
Dept: GYNECOLOGY | Age: 63
End: 2023-10-17
Payer: MEDICAID

## 2023-10-17 VITALS
BODY MASS INDEX: 26.81 KG/M2 | SYSTOLIC BLOOD PRESSURE: 132 MMHG | DIASTOLIC BLOOD PRESSURE: 82 MMHG | HEIGHT: 63 IN | WEIGHT: 151.3 LBS

## 2023-10-17 DIAGNOSIS — Z12.31 ENCOUNTER FOR SCREENING MAMMOGRAM FOR MALIGNANT NEOPLASM OF BREAST: ICD-10-CM

## 2023-10-17 DIAGNOSIS — Z01.419 WELL WOMAN EXAM WITH ROUTINE GYNECOLOGICAL EXAM: Primary | ICD-10-CM

## 2023-10-17 PROCEDURE — G8484 FLU IMMUNIZE NO ADMIN: HCPCS | Performed by: OBSTETRICS & GYNECOLOGY

## 2023-10-17 PROCEDURE — 99386 PREV VISIT NEW AGE 40-64: CPT | Performed by: OBSTETRICS & GYNECOLOGY

## 2023-10-17 RX ORDER — VALACYCLOVIR HYDROCHLORIDE 500 MG/1
500 TABLET, FILM COATED ORAL DAILY
Qty: 30 TABLET | Refills: 11 | Status: SHIPPED | OUTPATIENT
Start: 2023-10-17

## 2024-01-02 ENCOUNTER — OFFICE VISIT (OUTPATIENT)
Age: 64
End: 2024-01-02

## 2024-01-02 VITALS
SYSTOLIC BLOOD PRESSURE: 131 MMHG | HEIGHT: 63 IN | BODY MASS INDEX: 26.72 KG/M2 | OXYGEN SATURATION: 98 % | DIASTOLIC BLOOD PRESSURE: 83 MMHG | WEIGHT: 150.8 LBS | TEMPERATURE: 97.7 F | HEART RATE: 80 BPM

## 2024-01-02 DIAGNOSIS — L20.89 OTHER ATOPIC DERMATITIS: Primary | ICD-10-CM

## 2024-01-02 RX ORDER — DOXYCYCLINE HYCLATE 100 MG
100 TABLET ORAL 2 TIMES DAILY
Qty: 14 TABLET | Refills: 0 | Status: SHIPPED | OUTPATIENT
Start: 2024-01-02 | End: 2024-01-09

## 2024-01-02 RX ORDER — CETIRIZINE HYDROCHLORIDE 10 MG/1
10 TABLET ORAL DAILY
Qty: 30 TABLET | Refills: 0 | Status: SHIPPED | OUTPATIENT
Start: 2024-01-02 | End: 2024-02-01

## 2024-01-02 RX ORDER — PREDNISONE 20 MG/1
20 TABLET ORAL DAILY
Qty: 5 TABLET | Refills: 0 | Status: SHIPPED | OUTPATIENT
Start: 2024-01-02 | End: 2024-01-07

## 2024-01-02 RX ORDER — FAMOTIDINE 20 MG/1
20 TABLET, FILM COATED ORAL 2 TIMES DAILY
Qty: 60 TABLET | Refills: 3 | Status: SHIPPED | OUTPATIENT
Start: 2024-01-02

## 2024-01-02 NOTE — PROGRESS NOTES
Mental Status: She is alert and oriented to person, place, and time.   Psychiatric:         Mood and Affect: Mood normal.           An electronic signature was used to authenticate this note.    --Elier Alan, MINESH - CNP

## 2024-01-02 NOTE — PATIENT INSTRUCTIONS
Thank you for allowing us to care for you today and we hope you feel better soon     New Prescriptions    CETIRIZINE (ZYRTEC) 10 MG TABLET    Take 1 tablet by mouth daily    DOXYCYCLINE HYCLATE (VIBRA-TABS) 100 MG TABLET    Take 1 tablet by mouth 2 times daily for 7 days    FAMOTIDINE (PEPCID) 20 MG TABLET    Take 1 tablet by mouth 2 times daily    PREDNISONE (DELTASONE) 20 MG TABLET    Take 1 tablet by mouth daily for 5 days

## 2024-02-14 DIAGNOSIS — K21.00 GASTROESOPHAGEAL REFLUX DISEASE WITH ESOPHAGITIS, UNSPECIFIED WHETHER HEMORRHAGE: Chronic | ICD-10-CM

## 2024-02-14 RX ORDER — PANTOPRAZOLE SODIUM 40 MG/1
TABLET, DELAYED RELEASE ORAL
Qty: 30 TABLET | Refills: 3 | Status: SHIPPED | OUTPATIENT
Start: 2024-02-14

## 2024-06-25 DIAGNOSIS — K21.00 GASTROESOPHAGEAL REFLUX DISEASE WITH ESOPHAGITIS, UNSPECIFIED WHETHER HEMORRHAGE: Chronic | ICD-10-CM

## 2024-06-25 RX ORDER — PANTOPRAZOLE SODIUM 40 MG/1
TABLET, DELAYED RELEASE ORAL
Qty: 30 TABLET | Refills: 3 | OUTPATIENT
Start: 2024-06-25

## 2024-07-09 ENCOUNTER — OFFICE VISIT (OUTPATIENT)
Dept: INTERNAL MEDICINE CLINIC | Age: 64
End: 2024-07-09
Payer: MEDICAID

## 2024-07-09 VITALS
OXYGEN SATURATION: 99 % | HEIGHT: 63 IN | HEART RATE: 72 BPM | WEIGHT: 151 LBS | DIASTOLIC BLOOD PRESSURE: 73 MMHG | BODY MASS INDEX: 26.75 KG/M2 | SYSTOLIC BLOOD PRESSURE: 121 MMHG

## 2024-07-09 DIAGNOSIS — E78.2 MIXED HYPERLIPIDEMIA: ICD-10-CM

## 2024-07-09 DIAGNOSIS — M54.41 ACUTE RIGHT-SIDED LOW BACK PAIN WITH RIGHT-SIDED SCIATICA: ICD-10-CM

## 2024-07-09 DIAGNOSIS — R73.03 PREDIABETES: ICD-10-CM

## 2024-07-09 DIAGNOSIS — E55.9 VITAMIN D DEFICIENCY: ICD-10-CM

## 2024-07-09 DIAGNOSIS — K21.00 GASTROESOPHAGEAL REFLUX DISEASE WITH ESOPHAGITIS, UNSPECIFIED WHETHER HEMORRHAGE: Primary | ICD-10-CM

## 2024-07-09 PROCEDURE — 3017F COLORECTAL CA SCREEN DOC REV: CPT | Performed by: INTERNAL MEDICINE

## 2024-07-09 PROCEDURE — G8427 DOCREV CUR MEDS BY ELIG CLIN: HCPCS | Performed by: INTERNAL MEDICINE

## 2024-07-09 PROCEDURE — 1036F TOBACCO NON-USER: CPT | Performed by: INTERNAL MEDICINE

## 2024-07-09 PROCEDURE — 99214 OFFICE O/P EST MOD 30 MIN: CPT | Performed by: INTERNAL MEDICINE

## 2024-07-09 PROCEDURE — G8419 CALC BMI OUT NRM PARAM NOF/U: HCPCS | Performed by: INTERNAL MEDICINE

## 2024-07-09 RX ORDER — CYCLOBENZAPRINE HCL 5 MG
5 TABLET ORAL NIGHTLY PRN
Qty: 10 TABLET | Refills: 0 | Status: SHIPPED | OUTPATIENT
Start: 2024-07-09 | End: 2024-07-19

## 2024-07-09 RX ORDER — FAMOTIDINE 20 MG/1
20 TABLET, FILM COATED ORAL 2 TIMES DAILY
Qty: 60 TABLET | Refills: 3 | Status: SHIPPED | OUTPATIENT
Start: 2024-07-09

## 2024-07-09 ASSESSMENT — PATIENT HEALTH QUESTIONNAIRE - PHQ9
SUM OF ALL RESPONSES TO PHQ QUESTIONS 1-9: 1
1. LITTLE INTEREST OR PLEASURE IN DOING THINGS: SEVERAL DAYS
2. FEELING DOWN, DEPRESSED OR HOPELESS: NOT AT ALL
SUM OF ALL RESPONSES TO PHQ9 QUESTIONS 1 & 2: 1
SUM OF ALL RESPONSES TO PHQ QUESTIONS 1-9: 1

## 2024-07-09 NOTE — PROGRESS NOTES
Lydia Thomas (:  1960) is a 63 y.o. female, Established patient, here for evaluation of the following chief complaint(s):  6 Month Follow-Up, Leg Pain (Right leg pain radiating down to leg.), and Hip Pain      Assessment & Plan   ASSESSMENT/PLAN:  1. Gastroesophageal reflux disease with esophagitis, unspecified whether hemorrhage  - Stable   - Continue current dose of pantoprazole and Pepcid  -     Comprehensive Metabolic Panel; Future  -     famotidine (PEPCID) 20 MG tablet; Take 1 tablet by mouth 2 times daily, Disp-60 tablet, R-3Normal    2. Mixed hyperlipidemia  Uncontrolled  -     Lipid Panel; Future  -     TSH with Reflex; Future  Advised patient to complete fasting lipid panel as soon as possible, will treat based on the results    3. Vitamin D deficiency  - Stable   - Continue current dose of cholecalciferol.  Repeat vitamin D level, will treat based on results  -     Vitamin D 25 Hydroxy; Future    4. Prediabetes  -     Hemoglobin A1C; Future    5. Acute right-sided low back pain with right-sided sciatica  Uncontrolled  Probably due to muscle spasm  Prescribed Flexeril to take as needed for back pain  Referring to physical therapy and back and spine center for further management.  -     [facility]  - [place] [facility]  -     [facility] - Clinic Consult  -     cyclobenzaprine (FLEXERIL) 5 MG tablet; Take 1 tablet by mouth nightly as needed (Back pain), Disp-10 tablet, R-0Normal      Return in about 6 months (around 2025).         Subjective   SUBJECTIVE/OBJECTIVE:  Hyperlipidemia  This is a chronic problem. The current episode started more than 1 year ago. The problem is uncontrolled. Pertinent negatives include no shortness of breath. Current antihyperlipidemic treatment includes diet change and exercise. There are no compliance problems.  Risk factors for coronary artery disease include dyslipidemia.   Gastroesophageal Reflux  She reports no

## 2024-07-17 ENCOUNTER — LAB (OUTPATIENT)
Dept: INTERNAL MEDICINE CLINIC | Age: 64
End: 2024-07-17
Payer: MEDICAID

## 2024-07-17 ENCOUNTER — HOSPITAL ENCOUNTER (OUTPATIENT)
Dept: PHYSICAL THERAPY | Age: 64
Setting detail: THERAPIES SERIES
Discharge: HOME | End: 2024-07-17
Attending: INTERNAL MEDICINE
Payer: MEDICAID

## 2024-07-17 DIAGNOSIS — M25.551 RIGHT HIP PAIN: Primary | ICD-10-CM

## 2024-07-17 DIAGNOSIS — E78.2 MIXED HYPERLIPIDEMIA: ICD-10-CM

## 2024-07-17 DIAGNOSIS — E55.9 VITAMIN D DEFICIENCY: ICD-10-CM

## 2024-07-17 DIAGNOSIS — R53.1 WEAKNESS: ICD-10-CM

## 2024-07-17 DIAGNOSIS — K21.00 GASTROESOPHAGEAL REFLUX DISEASE WITH ESOPHAGITIS, UNSPECIFIED WHETHER HEMORRHAGE: ICD-10-CM

## 2024-07-17 DIAGNOSIS — R73.03 PREDIABETES: ICD-10-CM

## 2024-07-17 LAB
25(OH)D3 SERPL-MCNC: 25 NG/ML
ALBUMIN SERPL-MCNC: 4.4 G/DL (ref 3.4–5)
ALBUMIN/GLOB SERPL: 1.5 {RATIO} (ref 1.1–2.2)
ALP SERPL-CCNC: 122 U/L (ref 40–129)
ALT SERPL-CCNC: 62 U/L (ref 10–40)
ANION GAP SERPL CALCULATED.3IONS-SCNC: 16 MMOL/L (ref 3–16)
AST SERPL-CCNC: 56 U/L (ref 15–37)
BILIRUB SERPL-MCNC: 0.5 MG/DL (ref 0–1)
BUN SERPL-MCNC: 19 MG/DL (ref 7–20)
CALCIUM SERPL-MCNC: 9.6 MG/DL (ref 8.3–10.6)
CHLORIDE SERPL-SCNC: 101 MMOL/L (ref 99–110)
CHOLEST SERPL-MCNC: 236 MG/DL (ref 0–199)
CO2 SERPL-SCNC: 22 MMOL/L (ref 21–32)
CREAT SERPL-MCNC: 0.8 MG/DL (ref 0.6–1.2)
EST. AVERAGE GLUCOSE BLD GHB EST-MCNC: 128.4 MG/DL
GFR SERPLBLD CREATININE-BSD FMLA CKD-EPI: 83 ML/MIN/{1.73_M2}
GLUCOSE SERPL-MCNC: 113 MG/DL (ref 70–99)
HBA1C MFR BLD: 6.1 %
HDLC SERPL-MCNC: 54 MG/DL (ref 40–60)
LDLC SERPL CALC-MCNC: 144 MG/DL
POTASSIUM SERPL-SCNC: 4.2 MMOL/L (ref 3.5–5.1)
PROT SERPL-MCNC: 7.4 G/DL (ref 6.4–8.2)
SODIUM SERPL-SCNC: 139 MMOL/L (ref 136–145)
TRIGL SERPL-MCNC: 188 MG/DL (ref 0–150)
TSH SERPL DL<=0.005 MIU/L-ACNC: 2.48 UIU/ML (ref 0.27–4.2)
VLDLC SERPL CALC-MCNC: 38 MG/DL

## 2024-07-17 PROCEDURE — 97112 NEUROMUSCULAR REEDUCATION: CPT

## 2024-07-17 PROCEDURE — 97161 PT EVAL LOW COMPLEX 20 MIN: CPT

## 2024-07-17 PROCEDURE — 99999 PR OFFICE/OUTPT VISIT,PROCEDURE ONLY: CPT | Performed by: INTERNAL MEDICINE

## 2024-07-17 PROCEDURE — 97110 THERAPEUTIC EXERCISES: CPT

## 2024-07-17 PROCEDURE — 36415 COLL VENOUS BLD VENIPUNCTURE: CPT | Performed by: INTERNAL MEDICINE

## 2024-07-17 ASSESSMENT — ENCOUNTER SYMPTOMS: BACK PAIN: 1

## 2024-07-17 NOTE — PLAN OF CARE
[facility]- [facility] [address], [place] [address] office: [phone] fax: [phone]     Physical Therapy Initial Evaluation Certification      Dear Judy Elias*,    We had the pleasure of evaluating the following patient for physical therapy services at [facility].  A summary of our findings can be found in the initial assessment below.  This includes our plan of care.  If you have any questions or concerns regarding these findings, please do not hesitate to contact me at the office phone number listed above.  Thank you for the referral.     Physician Signature:_______________________________Date:__________________  By signing above (or electronic signature), therapist’s plan is approved by physician       Physical Therapy: TREATMENT/PROGRESS NOTE   Patient: Lydia Thomas (63 y.o. female)   Examination Date: 2024   :  1960 MRN: [id number]   Visit #: 1   Insurance Allowable Auth Needed   30 VPCY [x]Yes    []No    Insurance: Payor: [employer] [place] / Plan: [employer] [place]AN [place] / Product Type: *No Product type* /   Insurance ID: [id number] - (Medicaid Managed)  Secondary Insurance (if applicable):    Treatment Diagnosis:     ICD-10-CM    1. Right hip pain  M25.551       2. Weakness  R53.1          Medical Diagnosis:  Acute right-sided low back pain with right-sided sciatica [M54.41]   Referring Physician: Judy Elias*  PCP: Judy Elias MD     Plan of care signed (Y/N): N    Date of Patient follow up with Physician: not scheduled     Progress Report/POC: EVAL today  POC update due: (10 visits /OR AUTH LIMITS, whichever is less)  2024                                             Precautions/ Contra-indications:           Latex allergy:  NO  Pacemaker:    NO  Contraindications for Manipulation: None  Date of Surgery: N/A  Other:    Red

## 2024-07-18 RX ORDER — MELATONIN
1000 DAILY
Qty: 30 TABLET | Refills: 5 | Status: SHIPPED | OUTPATIENT
Start: 2024-07-18

## 2024-07-24 ENCOUNTER — APPOINTMENT (OUTPATIENT)
Dept: PHYSICAL THERAPY | Age: 64
End: 2024-07-24
Attending: INTERNAL MEDICINE
Payer: MEDICAID

## 2024-07-30 ENCOUNTER — HOSPITAL ENCOUNTER (OUTPATIENT)
Dept: PHYSICAL THERAPY | Age: 64
Setting detail: THERAPIES SERIES
Discharge: HOME | End: 2024-07-30
Attending: INTERNAL MEDICINE
Payer: MEDICAID

## 2024-07-30 PROCEDURE — 97110 THERAPEUTIC EXERCISES: CPT

## 2024-07-30 PROCEDURE — 97112 NEUROMUSCULAR REEDUCATION: CPT

## 2024-07-30 NOTE — FLOWSHEET NOTE
[facility]- [facility]patient Rehabilitation [facility] Therapy [address], [place] [address] office: [phone] fax: [phone]       Physical Therapy: TREATMENT/PROGRESS NOTE   Patient: Lydia Thomas (63 y.o. female)   Examination Date: 2024   :  1960 MRN: [id number]   Visit #: 2   Insurance Allowable Auth Needed   30 VPCY [x]Yes    []No    Insurance: Payor: [employer] [address] / Plan: [employer] PLAN [place] / Product Type: *No Product type* /   Insurance ID: [id number] - (Medicaid Managed)  Secondary Insurance (if applicable):    Treatment Diagnosis:     ICD-10-CM    1. Right hip pain  M25.551       2. Weakness  R53.1          Medical Diagnosis:  Acute right-sided low back pain with right-sided sciatica [M54.41]   Referring Physician: Judy Elias*  PCP: Judy Elias MD     Plan of care signed (Y/N): N    Date of Patient follow up with Physician: not scheduled     Progress Report/POC: EVAL today  POC update due: (10 visits /OR AUTH LIMITS, whichever is less)  2024                                             Precautions/ Contra-indications:           Latex allergy:  NO  Pacemaker:    NO  Contraindications for Manipulation: None  Date of Surgery: N/A  Other:    Red Flags:  None      C-SSRS Triggered by Intake questionnaire:   Patient answered 'NO' to both behavioral questions on intake.  No further screening warranted    Preferred Language for Healthcare:   [x] English       [] other:    SUBJECTIVE EXAMINATION     OUTCOME MEASURE DATE % Deficit   WOMAC 24 39%            Pain: 2/10  at rest; 8-9/10 at worst    Patient stated complaint:  Pt reports decreased symptom intensity and area of involvement through R LE since initial eval. Pt has been performing HEP every day without increased symptoms or prolonged muscle soreness. Has been able to get up off of floor, still very challenging, but pt is able to complete. Currently, 
No

## 2024-08-02 ENCOUNTER — HOSPITAL ENCOUNTER (OUTPATIENT)
Dept: PHYSICAL THERAPY | Age: 64
Setting detail: THERAPIES SERIES
Discharge: HOME | End: 2024-08-02
Attending: INTERNAL MEDICINE
Payer: MEDICAID

## 2024-08-02 PROCEDURE — 97110 THERAPEUTIC EXERCISES: CPT

## 2024-08-02 PROCEDURE — 97112 NEUROMUSCULAR REEDUCATION: CPT

## 2024-08-02 NOTE — FLOWSHEET NOTE
Restrictions/Precautions: Fibromyalgia    ROM/stretches     SKTC 3x30\"ea    DKTC     Hand Heel Rock     Child's Pose     Prone Press Up     Hip Flexor 3x30\"ea Sidney test, side of bed   Hamstring     Quadriceps     Piriformis    Figure 4 3x30\" push away    IT Band 3x30\"    Posterior Pelvic tilt 10x10\"    Hook lying rotation     Rec Bike 5' Level 4 Seat 5        Strengthening     CORE     TA Iso     TA March     TA Heel Slide     TA BKFO     Table Top     Dead Bug     Bird Dog     Front Plank     Side Plank     Palloff Press Single Black   1x10 R/L  Base Stance    Stir the Pot          GLUTES     Standing hip abd iso Foot supported on stool   1x10x5\"H  Ball into wall   Bridge 3x10DL High knee flexion to bias glutes; cues to press through heels>toes    Supine Clamshell     Side lying clamshell     SLR Abduction 3x10 R Only    SLR Extension 3x10ea Off EOB   Lateral band walks     Monster Walks                        Manual Intervention  Time / Reps Notes        Prone PA     IASTM     Lumbar Manip     SI Manip     Lumbar Traction     Hip Traction                Modalities:    No modalities applied this session    Education/Home Exercise Program:   Patient instructed on HEP on this date with handout provided and all questions answered. Discussions about how to progress sets/reps/resistance as necessary for fatigue and challenge. Patient was instructed to contact PT with any questions or concerns about HEP moving forward. Patient verbally stated she/he understood.     Access Code: [email or web address]  URL: [email or web address]  Date: 08/02/2024  Prepared by: Reynaldo Lizarraga    Exercises  - Supine ITB Stretch with Strap  - 1 x daily - 7 x weekly - 3 reps - 30\" hold  - Supine Figure 4 Piriformis Stretch  - 1 x daily - 7 x weekly - 3 reps - 30\" hold  - Supine Piriformis Stretch with Foot on Ground  - 1 x daily - 7 x weekly - 3 reps - 30\" hold  - Hooklying Single Knee to Chest Stretch  - 1 x daily - 7 x weekly - 3 reps - 30

## 2024-08-06 ENCOUNTER — HOSPITAL ENCOUNTER (OUTPATIENT)
Dept: PHYSICAL THERAPY | Age: 64
Setting detail: THERAPIES SERIES
Discharge: HOME | End: 2024-08-06
Attending: INTERNAL MEDICINE
Payer: MEDICAID

## 2024-08-06 ENCOUNTER — APPOINTMENT (OUTPATIENT)
Dept: PHYSICAL THERAPY | Age: 64
End: 2024-08-06
Attending: INTERNAL MEDICINE
Payer: MEDICAID

## 2024-08-06 PROCEDURE — 97112 NEUROMUSCULAR REEDUCATION: CPT

## 2024-08-06 PROCEDURE — 97110 THERAPEUTIC EXERCISES: CPT

## 2024-08-06 NOTE — FLOWSHEET NOTE
[facility]- Outpatient Rehabilitation [facility] Therapy [address], [place] [address] office: [phone] fax: [phone]       Physical Therapy: TREATMENT/PROGRESS NOTE   Patient: Lydia Thomas (63 y.o. female)   Examination Date: 2024   :  1960 MRN: [id number]   Visit #: 4 of 8  Insurance Allowable Auth Needed   8 visits -    30 VPCY [x]Yes    []No    Insurance: Payor: [employer] / Plan: [employer] PLAN [place] / Product Type: *No Product type* /   Insurance ID: [id number] - (Medicaid Managed)  Secondary Insurance (if applicable):    Treatment Diagnosis:     ICD-10-CM    1. Right hip pain  M25.551       2. Weakness  R53.1          Medical Diagnosis:  Acute right-sided low back pain with right-sided sciatica [M54.41]   Referring Physician: Judy Elias*  PCP: Judy Elias MD     Plan of care signed (Y/N): Yes    Date of Patient follow up with Physician: not scheduled     Progress Report/POC: No  POC update due: (10 visits /OR AUTH LIMITS, whichever is less)  2024                                             Precautions/ Contra-indications:           Latex allergy:  NO  Pacemaker:    NO  Contraindications for Manipulation: None  Date of Surgery: N/A  Other:    Red Flags:  None      C-SSRS Triggered by Intake questionnaire:   Patient answered 'NO' to both behavioral questions on intake.  No further screening warranted    Preferred Language for Healthcare:   [x] English       [] other:    SUBJECTIVE EXAMINATION     OUTCOME MEASURE DATE % Deficit   WOMAC 24 39%            Pain: 0/10    Patient stated complaint:  Pt reports slight increase in R proximal hip muscle soreness following last session. Decreased activity, held on HEP, and increased stretches to manage symptoms and symptoms improved appropriately with interventions. Has resumed HEP without issue.       Medications: flexeril only taken 2 times;

## 2024-08-09 ENCOUNTER — HOSPITAL ENCOUNTER (OUTPATIENT)
Dept: PHYSICAL THERAPY | Age: 64
Setting detail: THERAPIES SERIES
Discharge: HOME | End: 2024-08-09
Attending: INTERNAL MEDICINE
Payer: MEDICAID

## 2024-08-09 PROCEDURE — 97110 THERAPEUTIC EXERCISES: CPT

## 2024-08-09 PROCEDURE — 97112 NEUROMUSCULAR REEDUCATION: CPT

## 2024-08-09 NOTE — PLAN OF CARE
Activities (94175) including: functional mobility training and education.  Neuromuscular Re-education (74379) activation and proprioception, including postural re-education.    Manual Therapy (54973) as indicated to include: Soft Tissue Mobilization and Dry Needling/IASTM  Modalities as needed that may include: Cryotherapy  Patient education on activity modification and progression of HEP    Plan: Finish last week of approved PT and then transition to iHEP - follow back up with PT in future if needed 8/9/24    Electronically Signed by Russell Lizarraga, [place] [id number] Date: 08/09/2024     Note: Portions of this note have been templated and/or copied from initial evaluation, reassessments and prior notes for documentation efficiency.  Note: If patient does not return for scheduled/recommended follow up visits, this note will serve as a discharge from care along with the most recent update on progress.

## 2024-08-13 ENCOUNTER — HOSPITAL ENCOUNTER (OUTPATIENT)
Dept: PHYSICAL THERAPY | Age: 64
Setting detail: THERAPIES SERIES
Discharge: HOME | End: 2024-08-13
Attending: INTERNAL MEDICINE
Payer: MEDICAID

## 2024-08-13 PROCEDURE — 97530 THERAPEUTIC ACTIVITIES: CPT

## 2024-08-13 PROCEDURE — 97110 THERAPEUTIC EXERCISES: CPT

## 2024-08-13 PROCEDURE — 97112 NEUROMUSCULAR REEDUCATION: CPT

## 2024-08-13 NOTE — FLOWSHEET NOTE
[facility]- Outpatient [facility] Therapy [address], [place] [address] office: [phone] fax: [phone]       Physical Therapy: TREATMENT/PROGRESS NOTE   Patient: Lydia Thomas (63 y.o. female)   Examination Date: 2024   :  1960 MRN: [id number]   Visit #: 6 of 8  Insurance Allowable Auth Needed   8 visits -    30 VPCY [x]Yes    []No    Insurance: Payor: [employer] / Plan: [employer] [place]AN [place] / Product Type: *No Product type* /   Insurance ID: [id number] - (Medicaid Managed)  Secondary Insurance (if applicable):    Treatment Diagnosis:     ICD-10-CM    1. Right hip pain  M25.551       2. Weakness  R53.1          Medical Diagnosis:  Acute right-sided low back pain with right-sided sciatica [M54.41]   Referring Physician: Judy Elias*  PCP: Judy Elias MD     Plan of care signed (Y/N): Yes    Date of Patient follow up with Physician: not scheduled     Progress Report/POC: NO  POC update due: (10 visits /OR AUTH LIMITS, whichever is less)  24                                             Precautions/ Contra-indications:           Latex allergy:  NO  Pacemaker:    NO  Contraindications for Manipulation: None  Date of Surgery: N/A  Other:    Red Flags:  None      C-SSRS Triggered by Intake questionnaire:   Patient answered 'NO' to both behavioral questions on intake.  No further screening warranted    Preferred Language for Healthcare:   [x] English       [] other:    SUBJECTIVE EXAMINATION     OUTCOME MEASURE DATE % Deficit   WOMAC 24 39%   WOMAC 24 17%       Pain: 0/10    Patient stated complaint:  Pt reports mild ant R hip soreness following last session with addition of SLR, however, symptoms resolved appropriately and were not limiting. Pt continues to report decreasing symptoms throughout the day and improving functional tolerance and endurance since beginning skilled therapy.

## 2024-09-08 DIAGNOSIS — L20.89 OTHER ATOPIC DERMATITIS: ICD-10-CM

## 2024-09-11 RX ORDER — CETIRIZINE HYDROCHLORIDE 10 MG/1
10 TABLET ORAL DAILY
Qty: 30 TABLET | Refills: 0 | OUTPATIENT
Start: 2024-09-11 | End: 2024-10-11

## 2024-10-03 ENCOUNTER — TELEPHONE (OUTPATIENT)
Dept: INTERNAL MEDICINE CLINIC | Age: 64
End: 2024-10-03

## 2024-10-03 NOTE — TELEPHONE ENCOUNTER
LVM for pt to confirm if they will be willing to follow provider to new location or getting a new pcp

## 2024-10-17 NOTE — TELEPHONE ENCOUNTER
Last office visit 10/17/2023       Next office visit scheduled  10-23-24    Requested Prescriptions     Pending Prescriptions Disp Refills    valACYclovir (VALTREX) 500 MG tablet [Pharmacy Med Name: VALACYCLOVIR 500MG TABLETS] 30 tablet 11     Sig: TAKE 1 TABLET BY MOUTH DAILY

## 2024-10-22 RX ORDER — VALACYCLOVIR HYDROCHLORIDE 500 MG/1
500 TABLET, FILM COATED ORAL DAILY
Qty: 30 TABLET | Refills: 0 | Status: SHIPPED | OUTPATIENT
Start: 2024-10-22 | End: 2024-10-23 | Stop reason: SDUPTHER

## 2024-10-23 ENCOUNTER — OFFICE VISIT (OUTPATIENT)
Dept: GYNECOLOGY | Age: 64
End: 2024-10-23
Payer: MEDICAID

## 2024-10-23 VITALS — WEIGHT: 150.6 LBS | SYSTOLIC BLOOD PRESSURE: 122 MMHG | BODY MASS INDEX: 26.68 KG/M2 | DIASTOLIC BLOOD PRESSURE: 76 MMHG

## 2024-10-23 DIAGNOSIS — Z01.419 WELL WOMAN EXAM WITH ROUTINE GYNECOLOGICAL EXAM: Primary | ICD-10-CM

## 2024-10-23 DIAGNOSIS — Z12.31 ENCOUNTER FOR SCREENING MAMMOGRAM FOR MALIGNANT NEOPLASM OF BREAST: ICD-10-CM

## 2024-10-23 PROCEDURE — 99396 PREV VISIT EST AGE 40-64: CPT | Performed by: OBSTETRICS & GYNECOLOGY

## 2024-10-23 PROCEDURE — G8484 FLU IMMUNIZE NO ADMIN: HCPCS | Performed by: OBSTETRICS & GYNECOLOGY

## 2024-10-23 RX ORDER — VALACYCLOVIR HYDROCHLORIDE 500 MG/1
500 TABLET, FILM COATED ORAL DAILY
Qty: 30 TABLET | Refills: 11 | Status: SHIPPED | OUTPATIENT
Start: 2024-10-23

## 2024-10-23 NOTE — PROGRESS NOTES
Subjective:      Patient ID: Lydia Thomas is a 64 y.o. female.    HPI  pts here for annual gyn exam.  Denies complaints.  Due for mammogram.  Up to date with colonoscopy.  No bleeding.  Works at [employer].    Review of Systems Pertinent review of systems items discussed above.  All others systems items not discussed above were negative.      Objective:   Physical Exam  Constitutional:       Appearance: She is well-developed.   HENT:      Head: Normocephalic and atraumatic.   Neck:      Thyroid: No thyromegaly.      Trachea: No tracheal deviation.   Cardiovascular:      Rate and Rhythm: Normal rate and regular rhythm.      Heart sounds: Normal heart sounds. No murmur heard.  Pulmonary:      Effort: Pulmonary effort is normal. No respiratory distress.      Breath sounds: Normal breath sounds. No wheezing or rales.   Chest:   Breasts:     Right: No mass, nipple discharge or skin change.      Left: No mass, nipple discharge or skin change.   Abdominal:      General: There is no distension.      Palpations: Abdomen is soft. There is no mass.      Tenderness: There is no abdominal tenderness. There is no rebound.   Genitourinary:     Labia:         Right: No lesion.         Left: No lesion.       Vagina: Normal. No foreign body. No vaginal discharge.      Cervix: No cervical motion tenderness, discharge or friability.      Uterus: Not deviated, not enlarged, not fixed and not tender.       Adnexa:         Right: No mass or tenderness.          Left: No mass or tenderness.        Rectum: Normal. Guaiac result negative. No mass or external hemorrhoid.      Comments: Pap performed.    Musculoskeletal:         General: Normal range of motion.   Lymphadenopathy:      Cervical: No cervical adenopathy.   Neurological:      Mental Status: She is alert and oriented to person, place, and time.       Assessment:   Normal gyn exam     Plan:   F/u annual gyn exam.  Call with results.  Mammogram.       Efe Alfaro MD

## 2025-03-26 DIAGNOSIS — L20.89 OTHER ATOPIC DERMATITIS: ICD-10-CM

## 2025-03-31 RX ORDER — FAMOTIDINE 20 MG/1
20 TABLET, FILM COATED ORAL 2 TIMES DAILY
Qty: 60 TABLET | Refills: 3 | OUTPATIENT
Start: 2025-03-31

## 2025-07-09 ENCOUNTER — OFFICE VISIT (OUTPATIENT)
Dept: GYNECOLOGY | Age: 65
End: 2025-07-09
Payer: MEDICAID

## 2025-07-09 VITALS — BODY MASS INDEX: 26.82 KG/M2 | DIASTOLIC BLOOD PRESSURE: 72 MMHG | SYSTOLIC BLOOD PRESSURE: 116 MMHG | WEIGHT: 151.4 LBS

## 2025-07-09 DIAGNOSIS — R10.2 PELVIC PRESSURE IN FEMALE: Primary | ICD-10-CM

## 2025-07-09 PROCEDURE — 3017F COLORECTAL CA SCREEN DOC REV: CPT | Performed by: OBSTETRICS & GYNECOLOGY

## 2025-07-09 PROCEDURE — G8427 DOCREV CUR MEDS BY ELIG CLIN: HCPCS | Performed by: OBSTETRICS & GYNECOLOGY

## 2025-07-09 PROCEDURE — 1036F TOBACCO NON-USER: CPT | Performed by: OBSTETRICS & GYNECOLOGY

## 2025-07-09 PROCEDURE — G8419 CALC BMI OUT NRM PARAM NOF/U: HCPCS | Performed by: OBSTETRICS & GYNECOLOGY

## 2025-07-09 PROCEDURE — 99213 OFFICE O/P EST LOW 20 MIN: CPT | Performed by: OBSTETRICS & GYNECOLOGY

## 2025-07-09 NOTE — PROGRESS NOTES
Subjective:      Patient ID: Lydia Thomas is a 64 y.o. female.    HPI  pts here with pelvic pressure.  No bleeding.  No loss of urine but does void frequently.  H/o vag sling for incontinence years ago.  Up to date with pap.  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    Review of Systems Pertinent review of systems items discussed above.  All others systems items not discussed above were negative.      Objective:   Physical Exam  Af, vss  Ext- no lesions  Meatus- no lesions  Bladder- 1st degree cystocele  Vag- 1st degree prolapse, no d/c  Cx- no lesions  Ut- av, small, nt  Ad- nt, no masses    Assessment:   Pelvic pressure     Plan:   I reviewed and instructed on Kegels.  Advised to limit caffeine.  If continues to have sxs would need referral to urogyn.  All questions answered.       Efe Alfaro MD